# Patient Record
Sex: FEMALE | Race: WHITE | NOT HISPANIC OR LATINO | Employment: FULL TIME | URBAN - METROPOLITAN AREA
[De-identification: names, ages, dates, MRNs, and addresses within clinical notes are randomized per-mention and may not be internally consistent; named-entity substitution may affect disease eponyms.]

---

## 2018-10-11 ENCOUNTER — OFFICE VISIT (OUTPATIENT)
Dept: ENDOCRINOLOGY | Facility: CLINIC | Age: 58
End: 2018-10-11

## 2018-10-11 VITALS
OXYGEN SATURATION: 97 % | DIASTOLIC BLOOD PRESSURE: 72 MMHG | SYSTOLIC BLOOD PRESSURE: 122 MMHG | WEIGHT: 256.25 LBS | HEART RATE: 68 BPM

## 2018-10-11 DIAGNOSIS — N18.30 STAGE 3 CHRONIC KIDNEY DISEASE (HCC): ICD-10-CM

## 2018-10-11 DIAGNOSIS — E03.9 ACQUIRED HYPOTHYROIDISM: ICD-10-CM

## 2018-10-11 DIAGNOSIS — E11.42 DIABETIC PERIPHERAL NEUROPATHY ASSOCIATED WITH TYPE 2 DIABETES MELLITUS (HCC): ICD-10-CM

## 2018-10-11 DIAGNOSIS — E11.65 UNCONTROLLED TYPE 2 DIABETES MELLITUS WITH HYPERGLYCEMIA (HCC): Primary | ICD-10-CM

## 2018-10-11 PROBLEM — E11.9 TYPE 2 DIABETES MELLITUS (HCC): Status: ACTIVE | Noted: 2018-10-11

## 2018-10-11 LAB
ANION GAP SERPL CALCULATED.3IONS-SCNC: 10 MMOL/L (ref 3–11)
BUN BLD-MCNC: 27 MG/DL (ref 9–23)
BUN/CREAT SERPL: 18.5 (ref 7–25)
CALCIUM SPEC-SCNC: 9.3 MG/DL (ref 8.7–10.4)
CHLORIDE SERPL-SCNC: 101 MMOL/L (ref 99–109)
CO2 SERPL-SCNC: 26 MMOL/L (ref 20–31)
CREAT BLD-MCNC: 1.46 MG/DL (ref 0.6–1.3)
GFR SERPL CREATININE-BSD FRML MDRD: 37 ML/MIN/1.73
GLUCOSE BLD-MCNC: 344 MG/DL (ref 70–100)
GLUCOSE BLDC GLUCOMTR-MCNC: 349 MG/DL (ref 70–130)
HBA1C MFR BLD: 12 %
POTASSIUM BLD-SCNC: 4.1 MMOL/L (ref 3.5–5.5)
SODIUM BLD-SCNC: 137 MMOL/L (ref 132–146)
T4 FREE SERPL-MCNC: 1.51 NG/DL (ref 0.89–1.76)
TSH SERPL DL<=0.05 MIU/L-ACNC: 2.64 MIU/ML (ref 0.35–5.35)

## 2018-10-11 PROCEDURE — 82570 ASSAY OF URINE CREATININE: CPT | Performed by: PHYSICIAN ASSISTANT

## 2018-10-11 PROCEDURE — 99204 OFFICE O/P NEW MOD 45 MIN: CPT | Performed by: PHYSICIAN ASSISTANT

## 2018-10-11 PROCEDURE — 82947 ASSAY GLUCOSE BLOOD QUANT: CPT | Performed by: PHYSICIAN ASSISTANT

## 2018-10-11 PROCEDURE — 80048 BASIC METABOLIC PNL TOTAL CA: CPT | Performed by: PHYSICIAN ASSISTANT

## 2018-10-11 PROCEDURE — 83036 HEMOGLOBIN GLYCOSYLATED A1C: CPT | Performed by: PHYSICIAN ASSISTANT

## 2018-10-11 PROCEDURE — 84439 ASSAY OF FREE THYROXINE: CPT | Performed by: PHYSICIAN ASSISTANT

## 2018-10-11 PROCEDURE — 84443 ASSAY THYROID STIM HORMONE: CPT | Performed by: PHYSICIAN ASSISTANT

## 2018-10-11 PROCEDURE — 82043 UR ALBUMIN QUANTITATIVE: CPT | Performed by: PHYSICIAN ASSISTANT

## 2018-10-11 RX ORDER — TICAGRELOR 90 MG/1
90 TABLET ORAL 2 TIMES DAILY
COMMUNITY
Start: 2018-09-10

## 2018-10-11 RX ORDER — AMLODIPINE BESYLATE 10 MG/1
10 TABLET ORAL DAILY
COMMUNITY
Start: 2018-09-11

## 2018-10-11 RX ORDER — FUROSEMIDE 40 MG/1
40 TABLET ORAL 2 TIMES DAILY
COMMUNITY
Start: 2018-09-10

## 2018-10-11 RX ORDER — LEVOTHYROXINE SODIUM 137 UG/1
TABLET ORAL
COMMUNITY
Start: 2018-09-10 | End: 2019-06-03 | Stop reason: SDUPTHER

## 2018-10-11 RX ORDER — BLOOD-GLUCOSE METER
EACH MISCELLANEOUS
Qty: 1 KIT | Refills: 0 | Status: SHIPPED | OUTPATIENT
Start: 2018-10-11 | End: 2021-06-22 | Stop reason: ALTCHOICE

## 2018-10-11 RX ORDER — ASPIRIN 81 MG/1
81 TABLET ORAL DAILY
COMMUNITY

## 2018-10-11 RX ORDER — PIOGLITAZONEHYDROCHLORIDE 45 MG/1
TABLET ORAL
COMMUNITY
Start: 2018-09-10 | End: 2019-06-03 | Stop reason: SDUPTHER

## 2018-10-11 RX ORDER — METFORMIN HYDROCHLORIDE 500 MG/1
TABLET, EXTENDED RELEASE ORAL
COMMUNITY
Start: 2018-09-10 | End: 2019-02-27 | Stop reason: ALTCHOICE

## 2018-10-11 RX ORDER — VALSARTAN 320 MG/1
320 TABLET ORAL DAILY
COMMUNITY
Start: 2018-09-10

## 2018-10-11 RX ORDER — PANTOPRAZOLE SODIUM 40 MG/1
40 TABLET, DELAYED RELEASE ORAL DAILY
COMMUNITY
Start: 2018-09-10

## 2018-10-11 RX ORDER — GLYBURIDE 5 MG/1
10 TABLET ORAL 2 TIMES DAILY
COMMUNITY
End: 2019-06-03 | Stop reason: SDUPTHER

## 2018-10-11 RX ORDER — BISOPROLOL FUMARATE 5 MG/1
5 TABLET, FILM COATED ORAL 2 TIMES DAILY
COMMUNITY
Start: 2018-09-10

## 2018-10-11 RX ORDER — AMITRIPTYLINE HYDROCHLORIDE 10 MG/1
10 TABLET, FILM COATED ORAL NIGHTLY
COMMUNITY
Start: 2018-09-10

## 2018-10-11 RX ORDER — GLUCOSAMINE HCL/CHONDROITIN SU 500-400 MG
CAPSULE ORAL
Qty: 100 EACH | Refills: 6 | Status: SHIPPED | OUTPATIENT
Start: 2018-10-11 | End: 2021-06-22 | Stop reason: ALTCHOICE

## 2018-10-11 RX ORDER — NITROGLYCERIN 0.4 MG/1
0.4 TABLET SUBLINGUAL AS NEEDED
COMMUNITY
Start: 2018-09-10

## 2018-10-11 RX ORDER — ATORVASTATIN CALCIUM 80 MG/1
80 TABLET, FILM COATED ORAL NIGHTLY
COMMUNITY
Start: 2018-09-10

## 2018-10-11 NOTE — PROGRESS NOTES
"Chief Complaint  Establish care for Diabetes Mellitus and Hypothyroidism.    HPI   Emily Valdez is a 58 y.o. female who is here today for evaluation of Diabetes Mellitus type 2. The initial diagnosis of diabetes was made at age 34.    A1C- 12 (10/11/18), 7.3 (9/18/17)  . Ate donut and sandwich on the way to the office.  Labs reviewed:  8/22/18- TSH 4.73, FT4 1.82, FT3 2.1, GFR 44, LDL 55, , H/H wnl    Diabetic complications: cardiovascular disease s/p multiple MIs followed by Dr Gagnon. She has tingling in both feet x 20 years.   Eye exam current (within one year): utd. 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   Foot care and dental care: discussed    Current diabetic medications include:  Metofrmin XR 500mg 2 tab BID  Actos 45mg qd  Glyburide 5mg 2 tab BID    Statin: lipitor 80    Past medications: Byetta (n/v)    Diabetic Monitoring  - does not check    Nutrition:     Current diet: in general, an \"unhealthy\" diet- she admits to this. No sugared drinks.   Current exercise: none  Seen RD in past: no    Hypothyroidism- dx in her late 20s, on levothyroxine 137mcg daily. No recent dose increase.   She feels well for the most part.  Fatigued on the days she works. Job is busy, works as a  at [employer].   No new hair loss, dry skin, constipation, depression.     The following portions of the patient's history were reviewed and updated by me as appropriate: allergies, current medications, past family history, past social history, past surgical history and problem list.    No past medical history on file.    Medications    Current Outpatient Prescriptions:   •  amitriptyline (ELAVIL) 10 MG tablet, , Disp: , Rfl:   •  amLODIPine (NORVASC) 10 MG tablet, , Disp: , Rfl:   •  aspirin 81 MG EC tablet, Take 81 mg by mouth Daily., Disp: , Rfl:   •  atorvastatin (LIPITOR) 80 MG tablet, , Disp: , Rfl:   •  bisoprolol (ZEBeta) 5 MG tablet, , Disp: , Rfl:   •  BRILINTA 90 MG tablet tablet, , Disp: , Rfl:   •  " furosemide (LASIX) 40 MG tablet, , Disp: , Rfl:   •  glyBURIDE (DIAbeta) 5 MG tablet, 10 mg 2 (Two) Times a Day., Disp: , Rfl:   •  levothyroxine (SYNTHROID, LEVOTHROID) 137 MCG tablet, , Disp: , Rfl:   •  metFORMIN ER (GLUCOPHAGE-XR) 500 MG 24 hr tablet, , Disp: , Rfl:   •  pantoprazole (PROTONIX) 40 MG EC tablet, , Disp: , Rfl:   •  pioglitazone (ACTOS) 45 MG tablet, , Disp: , Rfl:   •  valsartan (DIOVAN) 320 MG tablet, , Disp: , Rfl:   •  Alcohol Swabs 70 % pads, Use qd, Disp: 100 each, Rfl: 6  •  Insulin Glargine (TOUJEO SOLOSTAR) 300 UNIT/ML solution pen-injector, Inject 20 Units under the skin into the appropriate area as directed every night at bedtime., Disp: 2 pen, Rfl: 6  •  Insulin Pen Needle 32G X 4 MM misc, Use QD, Disp: 50 each, Rfl: 6  •  nitroglycerin (NITROSTAT) 0.4 MG SL tablet, , Disp: , Rfl:     Review of Systems  Review of Systems   Constitutional: Negative for chills, fatigue, fever and unexpected weight change.   HENT: Positive for hearing loss. Negative for ear pain, nosebleeds, rhinorrhea and sore throat.    Eyes: Positive for visual disturbance. Negative for pain, discharge, redness and itching.   Respiratory: Negative for cough, shortness of breath and wheezing.    Cardiovascular: Positive for leg swelling. Negative for chest pain and palpitations.        Slow HR   Gastrointestinal: Negative for abdominal pain, blood in stool, constipation and diarrhea.   Endocrine: Negative for cold intolerance, heat intolerance and polydipsia.   Genitourinary: Negative for dysuria, frequency, hematuria, pelvic pain, vaginal bleeding and vaginal discharge.   Musculoskeletal: Negative for arthralgias, gait problem, joint swelling and myalgias.   Skin: Negative for rash.   Allergic/Immunologic: Negative.    Neurological: Negative for dizziness, syncope, weakness, numbness and headaches.   Hematological: Negative for adenopathy. Bruises/bleeds easily.   Psychiatric/Behavioral: Negative for sleep disturbance  and suicidal ideas. The patient is not nervous/anxious.         Physical Exam    /72   Pulse 68   Wt 116 kg (256 lb 4 oz)   SpO2 97% There is no height or weight on file to calculate BMI.  Physical Exam   Constitutional: She is oriented to person, place, and time. She appears well-developed. No distress.   HENT:   Head: Normocephalic.   Right Ear: External ear normal.   Left Ear: External ear normal.   Mouth/Throat: No oropharyngeal exudate.   Eyes: Conjunctivae and lids are normal. Right eye exhibits no discharge. Left eye exhibits no discharge. Right pupil is reactive. Left pupil is reactive.   Neck: No JVD present. No tracheal deviation present. No thyroid mass and no thyromegaly present.   Cardiovascular: Normal rate, regular rhythm, normal heart sounds and intact distal pulses.    No murmur heard.  Pulmonary/Chest: Effort normal and breath sounds normal. No respiratory distress. She has no wheezes.   Abdominal: Soft. Bowel sounds are normal. There is no tenderness.   Musculoskeletal: She exhibits no edema or tenderness.    Emily had a diabetic foot exam performed today.   During the foot exam she had a monofilament test performed (loss of sensation up to both heels).  Vascular Status -  Her right foot exhibits normal foot vasculature  and no edema. Her left foot exhibits normal foot vasculature  and no edema.  Skin Integrity  -  Her right foot skin is intact.  She has no right foot onychomycosis, no right foot ulcer and non-callous right foot.Her left foot skin is intact. She has no left foot onychomycosis, no left foot ulcer and non-callous left foot..  Lymphadenopathy:     She has no cervical adenopathy.   Neurological: She is alert and oriented to person, place, and time.   Skin: Skin is warm, dry and intact. No rash noted. She is not diaphoretic. No erythema.   Psychiatric: She has a normal mood and affect. Her speech is normal and behavior is normal. Thought content normal.       Labs and Imaging    Lab Results   Component Value Date    HGBA1C 12 10/11/2018     Office Visit on 10/11/2018   Component Date Value Ref Range Status   • Glucose 10/11/2018 349* 70 - 130 mg/dL Final   • Hemoglobin A1C 10/11/2018 12  % Final     No images are attached to the encounter or orders placed in the encounter.  No Images in the past 120 days found..    Assessment / Plan   Emily was seen today for establish care.    Diagnoses and all orders for this visit:    Uncontrolled type 2 diabetes mellitus with hyperglycemia (CMS/[facility])  -     POC Glucose Fingerstick  -     POC Glycosylated Hemoglobin (Hb A1C)  -     Microalbumin / Creatinine Urine Ratio - Urine, Clean Catch  -     Insulin Glargine (TOUJEO SOLOSTAR) 300 UNIT/ML solution pen-injector; Inject 20 Units under the skin into the appropriate area as directed every night at bedtime.  -     Insulin Pen Needle 32G X 4 MM misc; Use QD  -     Alcohol Swabs 70 % pads; Use qd    Acquired hypothyroidism  -     TSH  -     T4, Free    Stage 3 chronic kidney disease (CMS/[facility])  -     Basic Metabolic Panel    Diabetic peripheral neuropathy associated with type 2 diabetes mellitus (CMS/[facility])        Diabetes Mellitus 2 is under poor control.  -A1c 12, average sugar 300  -discussed diet. Literature provided  -encouraged exercise. Weekly goal discussed.  -start toujeo 10u qhs. Increase by 2u q3d up to 20u daily. Reviewed how to administer insulin. Sample and copay card provided  -cont metformin, actos, glyburide  -recheck gfr, may need to dc metformin  -rx new meter    1.  Diet: 3-4 carb servings per meal for females, 4-5 carb servings per meal for males  Spread carb intake throughout the day  Increase lean protein and vegetable intake  Avoid sugary drinks and processed carbs including crackers, cookies, cakes  2.  Exercise: Recommend at least 30 minutes of exercise daily, at least 5 days per week. Increase exercise gradually.   3.  Blood Glucose Goal: Blood glucose goal <150 fasting, <180 2 hr  postprandial  4.  Microalbumin due  5.  Education performed during this visit: long term diabetic complications discussed. , annual eye examinations at Ophthalmology discussed, dental hygiene discussed  and foot care reviewed., home glucose monitoring emphasized, all medications, side effects and compliance discussed carefully and Hypoglycemia management and prevention reviewed. Reviewed ‘ABCs’ of diabetes management (respective goals in parentheses):  A1C (<7), blood pressure (<130/80), and cholesterol (LDL <100, if CVD <70).    Hypothyroidism  -TSH slightly high 8/2018  -repeat TFTs today  -cont levothyroxine 137mcg daily.     Peripheral neuropathy  -reviewed need to wear shoes at all times and monitor feet daily    There are no Patient Instructions on file for this visit.    Follow up: Return in about 6 weeks (around 11/22/2018). She did not want to come in 4 weeks.    Discussed the nature of the disease including, risks, complications, implications, management, safe and proper use of medications. Encouraged therapeutic lifestyle changes including low calorie diet with plenty of fruits and vegetables, daily exercise, medication compliance, and keeping scheduled follow up appointments with me and any other providers. Encouraged patient to have appointment for complete physical, fasting labs, appropriate screenings, and immunizations on an annual basis.    30 min  of 45 min face-to-face visit time spent for coordination of care and counselling regarding identified problems as outlined in the objective, assessment and discussion portions of the documentation.    Awilda Conn PA-C

## 2018-10-13 LAB
CREAT 24H UR-MCNC: 60.8 MG/DL
MICROALBUMIN UR-MCNC: 15.2 UG/ML
MICROALBUMIN/CREAT UR: 25 MG/G CREAT (ref 0–30)

## 2018-10-18 ENCOUNTER — TELEPHONE (OUTPATIENT)
Dept: INTERNAL MEDICINE | Facility: CLINIC | Age: 58
End: 2018-10-18

## 2018-10-18 NOTE — TELEPHONE ENCOUNTER
LM on VM with these instructions. Advised pt to call with questions and to please let me know that she got the message.

## 2018-10-18 NOTE — TELEPHONE ENCOUNTER
PT CALLED IN TO REPORT RECENT BLOOD SUGARS     10/16/18- 258 NIGHT-231 Monday NIGHT 18 UNITS     10/17/18- 199 20 UNITS NIGHT BEFORE NIGHT 219    10/18/18- 20 UNITS NIGHT BEFORE 243  THIS MORNING 294 2 HRS AFTER LUNCH     STOPPED METFORMIN Wednesday     PT CALL BACK [phone] CAN LEAVE VM

## 2018-10-31 ENCOUNTER — TELEPHONE (OUTPATIENT)
Dept: INTERNAL MEDICINE | Facility: CLINIC | Age: 58
End: 2018-10-31

## 2018-11-30 ENCOUNTER — OFFICE VISIT (OUTPATIENT)
Dept: ENDOCRINOLOGY | Facility: CLINIC | Age: 58
End: 2018-11-30

## 2018-11-30 VITALS
WEIGHT: 250.31 LBS | HEART RATE: 76 BPM | DIASTOLIC BLOOD PRESSURE: 80 MMHG | SYSTOLIC BLOOD PRESSURE: 130 MMHG | OXYGEN SATURATION: 95 %

## 2018-11-30 DIAGNOSIS — E11.65 UNCONTROLLED TYPE 2 DIABETES MELLITUS WITH HYPERGLYCEMIA (HCC): Primary | ICD-10-CM

## 2018-11-30 DIAGNOSIS — E03.9 ACQUIRED HYPOTHYROIDISM: ICD-10-CM

## 2018-11-30 LAB — GLUCOSE BLDC GLUCOMTR-MCNC: 141 MG/DL (ref 70–130)

## 2018-11-30 PROCEDURE — 82947 ASSAY GLUCOSE BLOOD QUANT: CPT | Performed by: PHYSICIAN ASSISTANT

## 2018-11-30 PROCEDURE — 99214 OFFICE O/P EST MOD 30 MIN: CPT | Performed by: PHYSICIAN ASSISTANT

## 2018-11-30 NOTE — PROGRESS NOTES
"Chief Complaint  F/u for Diabetes Mellitus and Hypothyroidism.    HPI   Emily Valdez is a 58 y.o. female who is here today for f/u of Diabetes Mellitus type 2 and hypothyroidism. The initial diagnosis of diabetes was made at age 34.    A1C- 12 (10/11/18), 7.3 (9/18/17)   today.   Labs reviewed:  10/11/18- GFR 37, TSH 2.636, FT4 1.51  8/22/18- TSH 4.73, FT4 1.82, FT3 2.1, GFR 44, LDL 55, , H/H wnl    Diabetic complications: cardiovascular disease s/p multiple MIs followed by Dr Gagnon. She has tingling in both feet x 20 years.   Eye exam current (within one year): utd. 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   Foot care and dental care: discussed    Current diabetic medications include:  Metofrmin XR 500mg 2 tab BID - dc after last visit due to decreased renal fxn  Actos 45mg qd  Glyburide 5mg 2 tab BID  Toujeo 40U QHS  Statin: lipitor 80    Past medications: Byetta (n/v)    Diabetic Monitoring  -   Checking BID  fasting 100-130, bedtime 130-150  No hypoglycemia    Nutrition:     Current diet: in general, an \"unhealthy\" diet- has improved diet since last visit. Eat low carb.   Current exercise: none  Seen RD in past: no    Hypothyroidism- dx in her late 20s, on levothyroxine 137mcg daily. No recent dose increase.   She feels well for the most part.  Fatigued on the days she works. Job is busy, works as a  at [employer].   No new hair loss, dry skin, constipation, depression.     The following portions of the patient's history were reviewed and updated by me as appropriate: allergies, current medications, past family history, past social history, past surgical history and problem list.    History reviewed. No pertinent past medical history.    Medications    Current Outpatient Medications:   •  Alcohol Swabs 70 % pads, Use qd, Disp: 100 each, Rfl: 6  •  amitriptyline (ELAVIL) 10 MG tablet, , Disp: , Rfl:   •  amLODIPine (NORVASC) 10 MG tablet, , Disp: , Rfl:   •  aspirin 81 MG EC tablet, Take 81 mg by " mouth Daily., Disp: , Rfl:   •  atorvastatin (LIPITOR) 80 MG tablet, , Disp: , Rfl:   •  bisoprolol (ZEBeta) 5 MG tablet, , Disp: , Rfl:   •  Blood Glucose Monitoring Suppl (ACCU-CHEK JLUIS PLUS) w/Device kit, Use device to test blood sugar 3-4 times a day, Disp: 1 kit, Rfl: 0  •  BRILINTA 90 MG tablet tablet, , Disp: , Rfl:   •  furosemide (LASIX) 40 MG tablet, , Disp: , Rfl:   •  glucose blood (ACCU-CHEK JLUIS PLUS) test strip, Use as instructed to test blood sugar 3-4 times per day, Disp: 100 each, Rfl: 5  •  glyBURIDE (DIAbeta) 5 MG tablet, 10 mg 2 (Two) Times a Day., Disp: , Rfl:   •  Insulin Glargine (TOUJEO SOLOSTAR) 300 UNIT/ML solution pen-injector, Inject 40 Units under the skin into the appropriate area as directed every night at bedtime., Disp: 4 pen, Rfl: 6  •  Insulin Pen Needle 32G X 4 MM misc, Use QD, Disp: 50 each, Rfl: 6  •  Lancets 30G misc, Use as directed to test blood sugar 3-4 times a day, Disp: 100 each, Rfl: 5  •  levothyroxine (SYNTHROID, LEVOTHROID) 137 MCG tablet, , Disp: , Rfl:   •  nitroglycerin (NITROSTAT) 0.4 MG SL tablet, , Disp: , Rfl:   •  pantoprazole (PROTONIX) 40 MG EC tablet, , Disp: , Rfl:   •  pioglitazone (ACTOS) 45 MG tablet, , Disp: , Rfl:   •  valsartan (DIOVAN) 320 MG tablet, , Disp: , Rfl:   •  metFORMIN ER (GLUCOPHAGE-XR) 500 MG 24 hr tablet, , Disp: , Rfl:     Review of Systems  Review of Systems   Constitutional: Negative for chills, fatigue, fever and unexpected weight change.   HENT: Positive for hearing loss. Negative for ear pain, nosebleeds, rhinorrhea and sore throat.    Eyes: Positive for visual disturbance. Negative for pain, discharge, redness and itching.   Respiratory: Negative for cough, shortness of breath and wheezing.    Cardiovascular: Positive for leg swelling. Negative for chest pain and palpitations.        Slow HR   Gastrointestinal: Negative for abdominal pain, blood in stool, constipation and diarrhea.   Endocrine: Negative for cold intolerance,  heat intolerance and polydipsia.   Genitourinary: Negative for dysuria, frequency, hematuria, pelvic pain, vaginal bleeding and vaginal discharge.   Musculoskeletal: Negative for arthralgias, gait problem, joint swelling and myalgias.   Skin: Negative for rash.   Allergic/Immunologic: Negative.    Neurological: Negative for dizziness, syncope, weakness, numbness and headaches.   Hematological: Negative for adenopathy. Bruises/bleeds easily.   Psychiatric/Behavioral: Negative for sleep disturbance and suicidal ideas. The patient is not nervous/anxious.         Physical Exam    /80   Pulse 76   Wt 114 kg (250 lb 5 oz)   SpO2 95% There is no height or weight on file to calculate BMI.  Physical Exam   Constitutional: She is oriented to person, place, and time. She appears well-developed. No distress.   HENT:   Head: Normocephalic.   Right Ear: External ear normal.   Left Ear: External ear normal.   Mouth/Throat: No oropharyngeal exudate.   Eyes: Conjunctivae and lids are normal. Right eye exhibits no discharge. Left eye exhibits no discharge. Right pupil is reactive. Left pupil is reactive.   Neck: No JVD present. No tracheal deviation present. No thyroid mass and no thyromegaly present.   Cardiovascular: Normal rate, regular rhythm, normal heart sounds and intact distal pulses.   No murmur heard.  Pulmonary/Chest: Effort normal and breath sounds normal. No respiratory distress. She has no wheezes.   Abdominal: Soft. Bowel sounds are normal. There is no tenderness.   Musculoskeletal: She exhibits no edema or tenderness.   Lymphadenopathy:     She has no cervical adenopathy.   Neurological: She is alert and oriented to person, place, and time.   Skin: Skin is warm, dry and intact. No rash noted. She is not diaphoretic. No erythema.   Psychiatric: She has a normal mood and affect. Her speech is normal and behavior is normal. Thought content normal.       Labs and Imaging   Lab Results   Component Value Date     HGBA1C 12 10/11/2018     Office Visit on 11/30/2018   Component Date Value Ref Range Status   • Glucose 11/30/2018 141* 70 - 130 mg/dL Final     No images are attached to the encounter or orders placed in the encounter.  No Images in the past 120 days found..    Assessment / Plan   Emily was seen today for follow-up.    Diagnoses and all orders for this visit:    Uncontrolled type 2 diabetes mellitus with hyperglycemia (CMS/[facility])  -     POC Glucose Fingerstick  -     Insulin Glargine (TOUJEO SOLOSTAR) 300 UNIT/ML solution pen-injector; Inject 40 Units under the skin into the appropriate area as directed every night at bedtime.    Acquired hypothyroidism        Diabetes Mellitus 2 is under poor control.  -A1c 12 10/2018  -BS with significant improvement since last visit per BS log- fasting 100-130, bedtime 130-150  -off metformin since last visit due to decreased renal fxn  -start toujeo 10u qhs. Increase by 2u q3d up to 20u daily. Reviewed how to administer insulin. Sample and copay card provided  -cont actos, glyburide      1.  Diet: 3-4 carb servings per meal for females, 4-5 carb servings per meal for males  Spread carb intake throughout the day  Increase lean protein and vegetable intake  Avoid sugary drinks and processed carbs including crackers, cookies, cakes  2.  Exercise: Recommend at least 30 minutes of exercise daily, at least 5 days per week. Increase exercise gradually.   3.  Blood Glucose Goal: Blood glucose goal <150 fasting, <180 2 hr postprandial  4.  Microalbumin due 10/2018  5.  Education performed during this visit: long term diabetic complications discussed. , annual eye examinations at Ophthalmology discussed, dental hygiene discussed  and foot care reviewed., home glucose monitoring emphasized, all medications, side effects and compliance discussed carefully and Hypoglycemia management and prevention reviewed. Reviewed ‘ABCs’ of diabetes management (respective goals in parentheses):  A1C (<7),  blood pressure (<130/80), and cholesterol (LDL <100, if CVD <70).    Hypothyroidism  -TSH wnl 10/2018  -cont levothyroxine 137mcg daily.       There are no Patient Instructions on file for this visit.    Follow up: Return in about 3 months (around 2/28/2019).     Discussed the nature of the disease including, risks, complications, implications, management, safe and proper use of medications. Encouraged therapeutic lifestyle changes including low calorie diet with plenty of fruits and vegetables, daily exercise, medication compliance, and keeping scheduled follow up appointments with me and any other providers. Encouraged patient to have appointment for complete physical, fasting labs, appropriate screenings, and immunizations on an annual basis.    20 min  of 30 min face-to-face visit time spent for coordination of care and counselling regarding identified problems as outlined in the objective, assessment and discussion portions of the documentation.    Awilda Conn PA-C

## 2019-02-27 ENCOUNTER — OFFICE VISIT (OUTPATIENT)
Dept: ENDOCRINOLOGY | Facility: CLINIC | Age: 59
End: 2019-02-27

## 2019-02-27 VITALS
SYSTOLIC BLOOD PRESSURE: 140 MMHG | DIASTOLIC BLOOD PRESSURE: 82 MMHG | HEART RATE: 66 BPM | OXYGEN SATURATION: 96 % | WEIGHT: 257 LBS

## 2019-02-27 DIAGNOSIS — E11.65 UNCONTROLLED TYPE 2 DIABETES MELLITUS WITH HYPERGLYCEMIA (HCC): Primary | ICD-10-CM

## 2019-02-27 DIAGNOSIS — E03.9 ACQUIRED HYPOTHYROIDISM: ICD-10-CM

## 2019-02-27 LAB
ALBUMIN SERPL-MCNC: 4.29 G/DL (ref 3.2–4.8)
ALBUMIN/GLOB SERPL: 1.9 G/DL (ref 1.5–2.5)
ALP SERPL-CCNC: 94 U/L (ref 25–100)
ALT SERPL W P-5'-P-CCNC: 17 U/L (ref 7–40)
ANION GAP SERPL CALCULATED.3IONS-SCNC: 11 MMOL/L (ref 3–11)
ARTICHOKE IGE QN: 78 MG/DL (ref 0–130)
AST SERPL-CCNC: 17 U/L (ref 0–33)
BILIRUB SERPL-MCNC: 0.3 MG/DL (ref 0.3–1.2)
BUN BLD-MCNC: 43 MG/DL (ref 9–23)
BUN/CREAT SERPL: 27.7 (ref 7–25)
CALCIUM SPEC-SCNC: 9.6 MG/DL (ref 8.7–10.4)
CHLORIDE SERPL-SCNC: 109 MMOL/L (ref 99–109)
CHOLEST SERPL-MCNC: 142 MG/DL (ref 0–200)
CO2 SERPL-SCNC: 23 MMOL/L (ref 20–31)
CREAT BLD-MCNC: 1.55 MG/DL (ref 0.6–1.3)
GFR SERPL CREATININE-BSD FRML MDRD: 35 ML/MIN/1.73
GLOBULIN UR ELPH-MCNC: 2.3 GM/DL
GLUCOSE BLD-MCNC: 85 MG/DL (ref 70–100)
GLUCOSE BLDC GLUCOMTR-MCNC: 88 MG/DL (ref 70–130)
HBA1C MFR BLD: 7.2 %
HDLC SERPL-MCNC: 40 MG/DL (ref 40–60)
POTASSIUM BLD-SCNC: 4.1 MMOL/L (ref 3.5–5.5)
PROT SERPL-MCNC: 6.6 G/DL (ref 5.7–8.2)
SODIUM BLD-SCNC: 143 MMOL/L (ref 132–146)
T4 FREE SERPL-MCNC: 1.55 NG/DL (ref 0.89–1.76)
TRIGL SERPL-MCNC: 117 MG/DL (ref 0–150)
TSH SERPL DL<=0.05 MIU/L-ACNC: 0.66 MIU/ML (ref 0.35–5.35)

## 2019-02-27 PROCEDURE — 82947 ASSAY GLUCOSE BLOOD QUANT: CPT | Performed by: PHYSICIAN ASSISTANT

## 2019-02-27 PROCEDURE — 83036 HEMOGLOBIN GLYCOSYLATED A1C: CPT | Performed by: PHYSICIAN ASSISTANT

## 2019-02-27 PROCEDURE — 84443 ASSAY THYROID STIM HORMONE: CPT | Performed by: PHYSICIAN ASSISTANT

## 2019-02-27 PROCEDURE — 99214 OFFICE O/P EST MOD 30 MIN: CPT | Performed by: PHYSICIAN ASSISTANT

## 2019-02-27 PROCEDURE — 80061 LIPID PANEL: CPT | Performed by: PHYSICIAN ASSISTANT

## 2019-02-27 PROCEDURE — 80053 COMPREHEN METABOLIC PANEL: CPT | Performed by: PHYSICIAN ASSISTANT

## 2019-02-27 PROCEDURE — 84439 ASSAY OF FREE THYROXINE: CPT | Performed by: PHYSICIAN ASSISTANT

## 2019-02-27 NOTE — PROGRESS NOTES
"Chief Complaint  F/u for Diabetes Mellitus and Hypothyroidism.    HPI   Emily Valdez is a 58 y.o. female who is here today for f/u of Diabetes Mellitus type 2 and hypothyroidism. The initial diagnosis of diabetes was made at age 34.    A1C- 7.2 (2/27/19), 12 (10/11/18), 7.3 (9/18/17)    Labs reviewed:  10/11/18- GFR 37, TSH 2.636, FT4 1.51  8/22/18- TSH 4.73, FT4 1.82, FT3 2.1, GFR 44, LDL 55, , H/H wnl    Diabetic complications: cardiovascular disease s/p multiple MIs followed by Dr Gagnon. She has tingling in both feet x 20 years.   Eye exam current (within one year): utd. 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   Foot care and dental care: discussed    Current diabetic medications include:  Actos 45mg qd  Glyburide 5mg 2 tab BID. Does not take AC  Toujeo 50U QHS- increased from 40u on her own  Statin: lipitor 80    Past medications: Byetta (n/v), metformin (dc d/t decreased renal fxn)    Diabetic Monitoring  -   Checking 2x/day  fasting mostly 115-135, during the day mostly <180  Some hypoglycemia during the day    Nutrition:     Current diet: in general, an \"unhealthy\" diet- has improved diet since last visit. Eat low carb.   Current exercise: none  Seen RD in past: no    Hypothyroidism- dx in her late 20s, on levothyroxine 137mcg daily. No recent dose increase.   She feels well for the most part.  Fatigued on the days she works. Job is busy, works as a  at [employer].   No new hair loss, dry skin, constipation, depression.     The following portions of the patient's history were reviewed and updated by me as appropriate: allergies, current medications, past family history, past social history, past surgical history and problem list.    History reviewed. No pertinent past medical history.    Medications    Current Outpatient Medications:   •  Alcohol Swabs 70 % pads, Use qd, Disp: 100 each, Rfl: 6  •  amitriptyline (ELAVIL) 10 MG tablet, , Disp: , Rfl:   •  amLODIPine (NORVASC) 10 MG tablet, , Disp: , " Rfl:   •  aspirin 81 MG EC tablet, Take 81 mg by mouth Daily., Disp: , Rfl:   •  atorvastatin (LIPITOR) 80 MG tablet, , Disp: , Rfl:   •  bisoprolol (ZEBeta) 5 MG tablet, , Disp: , Rfl:   •  Blood Glucose Monitoring Suppl (ACCU-CHEK JLUIS PLUS) w/Device kit, Use device to test blood sugar 3-4 times a day, Disp: 1 kit, Rfl: 0  •  BRILINTA 90 MG tablet tablet, , Disp: , Rfl:   •  furosemide (LASIX) 40 MG tablet, , Disp: , Rfl:   •  glucose blood (ACCU-CHEK JLUIS PLUS) test strip, Use as instructed to test blood sugar 3-4 times per day, Disp: 100 each, Rfl: 5  •  glyBURIDE (DIAbeta) 5 MG tablet, 10 mg 2 (Two) Times a Day., Disp: , Rfl:   •  Insulin Glargine (TOUJEO SOLOSTAR) 300 UNIT/ML solution pen-injector, Inject 48 Units under the skin into the appropriate area as directed every night at bedtime., Disp: 12 pen, Rfl: 1  •  Insulin Pen Needle 32G X 4 MM misc, Use QD, Disp: 50 each, Rfl: 6  •  Lancets 30G misc, Use as directed to test blood sugar 3-4 times a day, Disp: 100 each, Rfl: 5  •  levothyroxine (SYNTHROID, LEVOTHROID) 137 MCG tablet, , Disp: , Rfl:   •  nitroglycerin (NITROSTAT) 0.4 MG SL tablet, , Disp: , Rfl:   •  pantoprazole (PROTONIX) 40 MG EC tablet, , Disp: , Rfl:   •  pioglitazone (ACTOS) 45 MG tablet, , Disp: , Rfl:   •  valsartan (DIOVAN) 320 MG tablet, , Disp: , Rfl:     Review of Systems  Review of Systems   Constitutional: Negative for chills, fatigue, fever and unexpected weight change.   HENT: Positive for hearing loss. Negative for ear pain, nosebleeds, rhinorrhea and sore throat.    Eyes: Positive for visual disturbance. Negative for pain, discharge, redness and itching.   Respiratory: Negative for cough, shortness of breath and wheezing.    Cardiovascular: Positive for leg swelling. Negative for chest pain and palpitations.        Slow HR   Gastrointestinal: Negative for abdominal pain, blood in stool, constipation and diarrhea.   Endocrine: Negative for cold intolerance, heat intolerance  and polydipsia.   Genitourinary: Negative for dysuria, frequency, hematuria, pelvic pain, vaginal bleeding and vaginal discharge.   Musculoskeletal: Negative for arthralgias, gait problem, joint swelling and myalgias.   Skin: Negative for rash.   Allergic/Immunologic: Negative.    Neurological: Negative for dizziness, syncope, weakness, numbness and headaches.   Hematological: Negative for adenopathy. Bruises/bleeds easily.   Psychiatric/Behavioral: Negative for sleep disturbance and suicidal ideas. The patient is not nervous/anxious.         Physical Exam    /82   Pulse 66   Wt 117 kg (257 lb)   SpO2 96% There is no height or weight on file to calculate BMI.  Physical Exam   Constitutional: She is oriented to person, place, and time. She appears well-developed. No distress.   HENT:   Head: Normocephalic.   Right Ear: External ear normal.   Left Ear: External ear normal.   Mouth/Throat: No oropharyngeal exudate.   Eyes: Conjunctivae and lids are normal. Right eye exhibits no discharge. Left eye exhibits no discharge. Right pupil is reactive. Left pupil is reactive.   Neck: No JVD present. No tracheal deviation present. No thyroid mass and no thyromegaly present.   Cardiovascular: Normal rate, regular rhythm, normal heart sounds and intact distal pulses.   No murmur heard.  Pulmonary/Chest: Effort normal and breath sounds normal. No respiratory distress. She has no wheezes.   Abdominal: Soft. Bowel sounds are normal. There is no tenderness.   Musculoskeletal: She exhibits no edema or tenderness.   Lymphadenopathy:     She has no cervical adenopathy.   Neurological: She is alert and oriented to person, place, and time.   Skin: Skin is warm, dry and intact. No rash noted. She is not diaphoretic. No erythema.   Psychiatric: She has a normal mood and affect. Her speech is normal and behavior is normal. Thought content normal.       Labs and Imaging   Lab Results   Component Value Date    HGBA1C 12 10/11/2018      No visits with results within 1 Month(s) from this visit.   Latest known visit with results is:   Office Visit on 11/30/2018   Component Date Value Ref Range Status   • Glucose 11/30/2018 141* 70 - 130 mg/dL Final     No images are attached to the encounter or orders placed in the encounter.  No Images in the past 120 days found..    Assessment / Plan   Emily was seen today for follow-up.    Diagnoses and all orders for this visit:    Uncontrolled type 2 diabetes mellitus with hyperglycemia (CMS/[facility])  -     Lipid Panel  -     Comprehensive Metabolic Panel  -     POC Glucose Fingerstick  -     POC Glycosylated Hemoglobin (Hb A1C)  -     Insulin Glargine (TOUJEO SOLOSTAR) 300 UNIT/ML solution pen-injector; Inject 48 Units under the skin into the appropriate area as directed every night at bedtime.    Acquired hypothyroidism  -     TSH  -     T4, Free        Diabetes Mellitus 2 is now under good control, with overall great improvement.  -A1c 7.2, down from 12 10/2018  -decrease toujeo to 48U QHS  -cont actos, glyburide  -emphasis on taking glyburide AC to avoid hypoglycemia      1.  Diet: 3-4 carb servings per meal for females, 4-5 carb servings per meal for males  Spread carb intake throughout the day  Increase lean protein and vegetable intake  Avoid sugary drinks and processed carbs including crackers, cookies, cakes  2.  Exercise: Recommend at least 30 minutes of exercise daily, at least 5 days per week. Increase exercise gradually.   3.  Blood Glucose Goal: Blood glucose goal <150 fasting, <180 2 hr postprandial  4.  Microalbumin due 10/2018  5.  Education performed during this visit: long term diabetic complications discussed. , annual eye examinations at Ophthalmology discussed, dental hygiene discussed  and foot care reviewed., home glucose monitoring emphasized, all medications, side effects and compliance discussed carefully and Hypoglycemia management and prevention reviewed. Reviewed ‘ABCs’ of diabetes  management (respective goals in parentheses):  A1C (<7), blood pressure (<130/80), and cholesterol (LDL <100, if CVD <70).    Hypothyroidism  -TSH wnl 10/2018  -cont levothyroxine 137mcg daily.  -recheck labs today       There are no Patient Instructions on file for this visit.    Follow up: Return in about 3 months (around 5/27/2019).     Discussed the nature of the disease including, risks, complications, implications, management, safe and proper use of medications. Encouraged therapeutic lifestyle changes including low calorie diet with plenty of fruits and vegetables, daily exercise, medication compliance, and keeping scheduled follow up appointments with me and any other providers. Encouraged patient to have appointment for complete physical, fasting labs, appropriate screenings, and immunizations on an annual basis.    20 min  of 30 min face-to-face visit time spent for coordination of care and counselling regarding identified problems as outlined in the objective, assessment and discussion portions of the documentation.    Awilda Conn PA-C

## 2019-06-03 ENCOUNTER — OFFICE VISIT (OUTPATIENT)
Dept: ENDOCRINOLOGY | Facility: CLINIC | Age: 59
End: 2019-06-03

## 2019-06-03 VITALS
DIASTOLIC BLOOD PRESSURE: 80 MMHG | OXYGEN SATURATION: 96 % | SYSTOLIC BLOOD PRESSURE: 128 MMHG | WEIGHT: 265 LBS | HEART RATE: 69 BPM

## 2019-06-03 DIAGNOSIS — E11.649 DIABETIC HYPOGLYCEMIA (HCC): ICD-10-CM

## 2019-06-03 DIAGNOSIS — E03.9 ACQUIRED HYPOTHYROIDISM: ICD-10-CM

## 2019-06-03 DIAGNOSIS — E11.65 UNCONTROLLED TYPE 2 DIABETES MELLITUS WITH HYPERGLYCEMIA (HCC): Primary | ICD-10-CM

## 2019-06-03 LAB
GLUCOSE BLDC GLUCOMTR-MCNC: 152 MG/DL (ref 70–130)
HBA1C MFR BLD: 7.5 %

## 2019-06-03 PROCEDURE — 82947 ASSAY GLUCOSE BLOOD QUANT: CPT | Performed by: PHYSICIAN ASSISTANT

## 2019-06-03 PROCEDURE — 83036 HEMOGLOBIN GLYCOSYLATED A1C: CPT | Performed by: PHYSICIAN ASSISTANT

## 2019-06-03 PROCEDURE — 99214 OFFICE O/P EST MOD 30 MIN: CPT | Performed by: PHYSICIAN ASSISTANT

## 2019-06-03 RX ORDER — PIOGLITAZONEHYDROCHLORIDE 45 MG/1
45 TABLET ORAL DAILY
Qty: 90 TABLET | Refills: 1 | Status: SHIPPED | OUTPATIENT
Start: 2019-06-03 | End: 2019-09-04 | Stop reason: SDUPTHER

## 2019-06-03 RX ORDER — LEVOTHYROXINE SODIUM 137 UG/1
137 TABLET ORAL DAILY
Qty: 90 TABLET | Refills: 1 | Status: SHIPPED | OUTPATIENT
Start: 2019-06-03 | End: 2019-09-04 | Stop reason: SDUPTHER

## 2019-06-03 RX ORDER — GLYBURIDE 5 MG/1
10 TABLET ORAL 2 TIMES DAILY WITH MEALS
Qty: 360 TABLET | Refills: 1 | Status: SHIPPED | OUTPATIENT
Start: 2019-06-03 | End: 2019-09-04 | Stop reason: SDUPTHER

## 2019-06-03 NOTE — PROGRESS NOTES
"Chief Complaint  F/u for Diabetes Mellitus and Hypothyroidism.    HPI   Emily Valdez is a 59 y.o. female who is here today for f/u of Diabetes Mellitus type 2 and hypothyroidism. The initial diagnosis of diabetes was made at age 34.    A1C- 7.5 (6/3/19), 7.2 (2/27/19), 12 (10/11/18), 7.3 (9/18/17)    Diabetic complications: cardiovascular disease s/p multiple MIs followed by Dr Gagnon. She has tingling in both feet x 20 years.   Eye exam current (within one year): utd. 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   Foot care and dental care: discussed    Current diabetic medications include:  Actos 45mg qd  Glyburide 5mg 2 tab BID.  Toujeo 50U QHS - did not decrease to 45u as discussed last visit  Statin: lipitor 80    Past medications: Byetta (n/v), metformin (dc d/t decreased renal fxn)    Diabetic Monitoring  -   Checking 2x/day  fasting mostly <130, during the day mostly <180  Some hypoglycemia during the night/morning    Nutrition:     Current diet: in general, an \"unhealthy\" diet- has improved diet since last visit. Eat low carb.   Current exercise: none  Seen RD in past: no    Hypothyroidism- dx in her late 20s, on levothyroxine 137mcg daily. No recent dose increase.   She feels well for the most part.  Fatigued on the days she works. Job is busy, works as a  at [employer].   No new hair loss, dry skin, constipation, depression.     The following portions of the patient's history were reviewed and updated by me as appropriate: allergies, current medications, past family history, past social history, past surgical history and problem list.    History reviewed. No pertinent past medical history.    Medications    Current Outpatient Medications:   •  Alcohol Swabs 70 % pads, Use qd, Disp: 100 each, Rfl: 6  •  amitriptyline (ELAVIL) 10 MG tablet, , Disp: , Rfl:   •  amLODIPine (NORVASC) 10 MG tablet, , Disp: , Rfl:   •  aspirin 81 MG EC tablet, Take 81 mg by mouth Daily., Disp: , Rfl:   •  atorvastatin (LIPITOR) " 80 MG tablet, , Disp: , Rfl:   •  bisoprolol (ZEBeta) 5 MG tablet, , Disp: , Rfl:   •  Blood Glucose Monitoring Suppl (ACCU-CHEK JLUIS PLUS) w/Device kit, Use device to test blood sugar 3-4 times a day, Disp: 1 kit, Rfl: 0  •  BRILINTA 90 MG tablet tablet, , Disp: , Rfl:   •  furosemide (LASIX) 40 MG tablet, , Disp: , Rfl:   •  glucose blood (ACCU-CHEK JLUIS PLUS) test strip, Use as instructed to test blood sugar 3-4 times per day, Disp: 100 each, Rfl: 5  •  glyBURIDE (DIAbeta) 5 MG tablet, Take 2 tablets by mouth 2 (Two) Times a Day With Meals., Disp: 360 tablet, Rfl: 1  •  Insulin Glargine (TOUJEO SOLOSTAR) 300 UNIT/ML solution pen-injector, Inject 45 Units under the skin into the appropriate area as directed every night at bedtime., Disp: 12 pen, Rfl: 1  •  Insulin Pen Needle 32G X 4 MM misc, Use QD, Disp: 50 each, Rfl: 6  •  Lancets 30G misc, Use as directed to test blood sugar 3-4 times a day, Disp: 100 each, Rfl: 5  •  levothyroxine (SYNTHROID, LEVOTHROID) 137 MCG tablet, Take 1 tablet by mouth Daily., Disp: 90 tablet, Rfl: 1  •  nitroglycerin (NITROSTAT) 0.4 MG SL tablet, , Disp: , Rfl:   •  pantoprazole (PROTONIX) 40 MG EC tablet, , Disp: , Rfl:   •  pioglitazone (ACTOS) 45 MG tablet, Take 1 tablet by mouth Daily., Disp: 90 tablet, Rfl: 1  •  valsartan (DIOVAN) 320 MG tablet, , Disp: , Rfl:     Review of Systems  Review of Systems   Constitutional: Negative for chills, fatigue, fever and unexpected weight change.   HENT: Positive for hearing loss. Negative for ear pain, nosebleeds, rhinorrhea and sore throat.    Eyes: Positive for visual disturbance. Negative for pain, discharge, redness and itching.   Respiratory: Negative for cough, shortness of breath and wheezing.    Cardiovascular: Positive for leg swelling. Negative for chest pain and palpitations.        Slow HR   Gastrointestinal: Negative for abdominal pain, blood in stool, constipation and diarrhea.   Endocrine: Negative for cold intolerance, heat  intolerance and polydipsia.   Genitourinary: Negative for dysuria, frequency, hematuria, pelvic pain, vaginal bleeding and vaginal discharge.   Musculoskeletal: Negative for arthralgias, gait problem, joint swelling and myalgias.   Skin: Negative for rash.   Allergic/Immunologic: Negative.    Neurological: Negative for dizziness, syncope, weakness, numbness and headaches.   Hematological: Negative for adenopathy. Bruises/bleeds easily.   Psychiatric/Behavioral: Negative for sleep disturbance and suicidal ideas. The patient is not nervous/anxious.         Physical Exam    /80   Pulse 69   Wt 120 kg (265 lb)   SpO2 96% There is no height or weight on file to calculate BMI.  Physical Exam   Constitutional: She is oriented to person, place, and time. She appears well-developed. No distress.   HENT:   Head: Normocephalic.   Right Ear: External ear normal.   Left Ear: External ear normal.   Mouth/Throat: No oropharyngeal exudate.   Eyes: Conjunctivae and lids are normal. Right eye exhibits no discharge. Left eye exhibits no discharge. Right pupil is reactive. Left pupil is reactive.   Neck: No JVD present. No tracheal deviation present. No thyroid mass and no thyromegaly present.   Cardiovascular: Normal rate, regular rhythm, normal heart sounds and intact distal pulses.   No murmur heard.  Pulmonary/Chest: Effort normal and breath sounds normal. No respiratory distress. She has no wheezes.   Abdominal: Soft. Bowel sounds are normal. There is no tenderness.   Musculoskeletal: She exhibits no edema or tenderness.   Lymphadenopathy:     She has no cervical adenopathy.   Neurological: She is alert and oriented to person, place, and time.   Skin: Skin is warm, dry and intact. No rash noted. She is not diaphoretic. No erythema.   Psychiatric: She has a normal mood and affect. Her speech is normal and behavior is normal. Thought content normal.       Labs and Imaging   Lab Results   Component Value Date    HGBA1C 7.5  06/03/2019    HGBA1C 7.2 02/27/2019    HGBA1C 12 10/11/2018     Office Visit on 06/03/2019   Component Date Value Ref Range Status   • Glucose 06/03/2019 152* 70 - 130 mg/dL Final   • Hemoglobin A1C 06/03/2019 7.5  % Final     No images are attached to the encounter or orders placed in the encounter.  No Images in the past 120 days found..    Assessment / Plan   Emily was seen today for follow-up.    Diagnoses and all orders for this visit:    Uncontrolled type 2 diabetes mellitus with hyperglycemia (CMS/HCC)  -     POC Glucose Fingerstick  -     POC Glycosylated Hemoglobin (Hb A1C)  -     pioglitazone (ACTOS) 45 MG tablet; Take 1 tablet by mouth Daily.  -     glyBURIDE (DIAbeta) 5 MG tablet; Take 2 tablets by mouth 2 (Two) Times a Day With Meals.  -     Insulin Glargine (TOUJEO SOLOSTAR) 300 UNIT/ML solution pen-injector; Inject 45 Units under the skin into the appropriate area as directed every night at bedtime.    Acquired hypothyroidism  -     levothyroxine (SYNTHROID, LEVOTHROID) 137 MCG tablet; Take 1 tablet by mouth Daily.    Diabetic hypoglycemia (CMS/HCC)        Diabetes Mellitus 2 is now under fair control  -A1c 7.5 up from 7.2 2/2019, but down from 12 10/2018  -some night time/morning hypglycemia  -decrease toujeo to 45U qhs. Did not decrease last visit.  -cont actos, glyburide  -emphasis on taking glyburide AC to avoid hypoglycemia  -call if having issues      1.  Diet: 3-4 carb servings per meal for females, 4-5 carb servings per meal for males  Spread carb intake throughout the day  Increase lean protein and vegetable intake  Avoid sugary drinks and processed carbs including crackers, cookies, cakes  2.  Exercise: Recommend at least 30 minutes of exercise daily, at least 5 days per week. Increase exercise gradually.   3.  Blood Glucose Goal: Blood glucose goal <150 fasting, <180 2 hr postprandial  4.  Microalbumin due 10/2018  5.  Education performed during this visit: long term diabetic complications  discussed. , annual eye examinations at Ophthalmology discussed, dental hygiene discussed  and foot care reviewed., home glucose monitoring emphasized, all medications, side effects and compliance discussed carefully and Hypoglycemia management and prevention reviewed. Reviewed ‘ABCs’ of diabetes management (respective goals in parentheses):  A1C (<7), blood pressure (<130/80), and cholesterol (LDL <100, if CVD <70).    Hypothyroidism  -TSH wnl 10/2018  -cont levothyroxine 137mcg daily.  -normal TFTs 2/2019      Patient Instructions   Decrease Toujeo to 45 units at bedtime.  Continue actos 45mg daily and glyburide 5mg 2 pills 2x/day before meals.       Follow up: Return in about 3 months (around 9/3/2019).     Discussed the nature of the disease including, risks, complications, implications, management, safe and proper use of medications. Encouraged therapeutic lifestyle changes including low calorie diet with plenty of fruits and vegetables, daily exercise, medication compliance, and keeping scheduled follow up appointments with me and any other providers. Encouraged patient to have appointment for complete physical, fasting labs, appropriate screenings, and immunizations on an annual basis.    Awilda Conn PA-C

## 2019-06-03 NOTE — PATIENT INSTRUCTIONS
Decrease Toujeo to 45 units at bedtime.  Continue actos 45mg daily and glyburide 5mg 2 pills 2x/day before meals.

## 2019-09-04 ENCOUNTER — OFFICE VISIT (OUTPATIENT)
Dept: ENDOCRINOLOGY | Facility: CLINIC | Age: 59
End: 2019-09-04

## 2019-09-04 VITALS
SYSTOLIC BLOOD PRESSURE: 120 MMHG | HEART RATE: 61 BPM | OXYGEN SATURATION: 97 % | WEIGHT: 265 LBS | DIASTOLIC BLOOD PRESSURE: 78 MMHG

## 2019-09-04 DIAGNOSIS — E03.9 ACQUIRED HYPOTHYROIDISM: ICD-10-CM

## 2019-09-04 DIAGNOSIS — E11.65 UNCONTROLLED TYPE 2 DIABETES MELLITUS WITH HYPERGLYCEMIA (HCC): Primary | ICD-10-CM

## 2019-09-04 LAB
GLUCOSE BLDC GLUCOMTR-MCNC: 147 MG/DL (ref 70–130)
HBA1C MFR BLD: 7.6 %

## 2019-09-04 PROCEDURE — 99213 OFFICE O/P EST LOW 20 MIN: CPT | Performed by: PHYSICIAN ASSISTANT

## 2019-09-04 PROCEDURE — 83036 HEMOGLOBIN GLYCOSYLATED A1C: CPT | Performed by: PHYSICIAN ASSISTANT

## 2019-09-04 PROCEDURE — 82947 ASSAY GLUCOSE BLOOD QUANT: CPT | Performed by: PHYSICIAN ASSISTANT

## 2019-09-04 RX ORDER — PIOGLITAZONEHYDROCHLORIDE 45 MG/1
45 TABLET ORAL DAILY
Qty: 90 TABLET | Refills: 1 | Status: SHIPPED | OUTPATIENT
Start: 2019-09-04 | End: 2020-03-16 | Stop reason: SDUPTHER

## 2019-09-04 RX ORDER — POTASSIUM CHLORIDE 750 MG/1
10 CAPSULE, EXTENDED RELEASE ORAL DAILY
COMMUNITY
Start: 2019-08-27

## 2019-09-04 RX ORDER — LEVOTHYROXINE SODIUM 137 UG/1
137 TABLET ORAL DAILY
Qty: 90 TABLET | Refills: 1 | Status: SHIPPED | OUTPATIENT
Start: 2019-09-04 | End: 2020-08-19 | Stop reason: SDUPTHER

## 2019-09-04 RX ORDER — GLYBURIDE 5 MG/1
10 TABLET ORAL 2 TIMES DAILY WITH MEALS
Qty: 360 TABLET | Refills: 1 | Status: SHIPPED | OUTPATIENT
Start: 2019-09-04 | End: 2020-03-16 | Stop reason: SDUPTHER

## 2019-09-04 NOTE — PROGRESS NOTES
"Chief Complaint  F/u for Diabetes Mellitus and Hypothyroidism.    HPI   Emily Valdez is a 59 y.o. female who is here today for f/u of Diabetes Mellitus type 2 and hypothyroidism. The initial diagnosis of diabetes was made at age 34.    Changed from 8h to 12h shifts at work at the end of July, which caused a change in eating habits. It took her several week to get back on track. Doing much better now.     A1C- 7.6 (9/4/19), 7.5 (6/3/19), 7.2 (2/27/19), 12 (10/11/18), 7.3 (9/18/17)    Diabetic complications: cardiovascular disease s/p multiple MIs followed by Dr Gagnon. She has tingling in both feet x 20 years.   Eye exam current (within one year): utd. 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   Foot care and dental care: discussed    Current diabetic medications include:  Actos 45mg qd  Glyburide 5mg 2 tab BID.  Toujeo 50U QHS - did not decrease to 45u   Statin: lipitor 80    Past medications: Byetta (n/v), metformin (dc d/t decreased renal fxn)    Diabetic Monitoring  -   Checking 2x/day  fasting mostly <130, during the day mostly <180  Rare hypoglycemia    Nutrition:     Current diet: in general, an \"unhealthy\" diet- has improved diet since last visit. Eat low carb.   Current exercise: none  Seen RD in past: no    Hypothyroidism- dx in her late 20s, on levothyroxine 137mcg daily. No recent dose increase.   She feels well for the most part.  Fatigued on the days she works. Job is busy, works as a  at [employer].   No new hair loss, dry skin, constipation, depression.     The following portions of the patient's history were reviewed and updated by me as appropriate: allergies, current medications, past family history, past social history, past surgical history and problem list.    History reviewed. No pertinent past medical history.    Medications    Current Outpatient Medications:   •  Alcohol Swabs 70 % pads, Use qd, Disp: 100 each, Rfl: 6  •  amitriptyline (ELAVIL) 10 MG tablet, , Disp: , Rfl:   •  amLODIPine " (NORVASC) 10 MG tablet, , Disp: , Rfl:   •  aspirin 81 MG EC tablet, Take 81 mg by mouth Daily., Disp: , Rfl:   •  atorvastatin (LIPITOR) 80 MG tablet, , Disp: , Rfl:   •  bisoprolol (ZEBeta) 5 MG tablet, , Disp: , Rfl:   •  Blood Glucose Monitoring Suppl (ACCU-CHEK JLUIS PLUS) w/Device kit, Use device to test blood sugar 3-4 times a day, Disp: 1 kit, Rfl: 0  •  BRILINTA 90 MG tablet tablet, , Disp: , Rfl:   •  furosemide (LASIX) 40 MG tablet, , Disp: , Rfl:   •  glucose blood (ACCU-CHEK JLUIS PLUS) test strip, Use as instructed to test blood sugar 3-4 times per day, Disp: 300 each, Rfl: 3  •  glyBURIDE (DIAbeta) 5 MG tablet, Take 2 tablets by mouth 2 (Two) Times a Day With Meals., Disp: 360 tablet, Rfl: 1  •  Insulin Glargine (TOUJEO SOLOSTAR) 300 UNIT/ML solution pen-injector injection, Inject 45-50 Units under the skin into the appropriate area as directed every night at bedtime. As directed, Disp: 12 pen, Rfl: 1  •  Insulin Pen Needle 32G X 4 MM misc, Use QD, Disp: 150 each, Rfl: 3  •  Lancets 30G misc, Use as directed to test blood sugar 3-4 times a day, Disp: 100 each, Rfl: 5  •  levothyroxine (SYNTHROID, LEVOTHROID) 137 MCG tablet, Take 1 tablet by mouth Daily., Disp: 90 tablet, Rfl: 1  •  nitroglycerin (NITROSTAT) 0.4 MG SL tablet, , Disp: , Rfl:   •  pantoprazole (PROTONIX) 40 MG EC tablet, , Disp: , Rfl:   •  pioglitazone (ACTOS) 45 MG tablet, Take 1 tablet by mouth Daily., Disp: 90 tablet, Rfl: 1  •  potassium chloride (MICRO-K) 10 MEQ CR capsule, , Disp: , Rfl:   •  valsartan (DIOVAN) 320 MG tablet, , Disp: , Rfl:     Review of Systems  Review of Systems   Constitutional: Negative for chills, fatigue, fever and unexpected weight change.   HENT: Positive for hearing loss. Negative for ear pain, nosebleeds, rhinorrhea and sore throat.    Eyes: Positive for visual disturbance. Negative for pain, discharge, redness and itching.   Respiratory: Negative for cough, shortness of breath and wheezing.     Cardiovascular: Positive for leg swelling. Negative for chest pain and palpitations.        Slow HR   Gastrointestinal: Negative for abdominal pain, blood in stool, constipation and diarrhea.   Endocrine: Negative for cold intolerance, heat intolerance and polydipsia.   Genitourinary: Negative for dysuria, frequency, hematuria, pelvic pain, vaginal bleeding and vaginal discharge.   Musculoskeletal: Negative for arthralgias, gait problem, joint swelling and myalgias.   Skin: Negative for rash.   Allergic/Immunologic: Negative.    Neurological: Negative for dizziness, syncope, weakness, numbness and headaches.   Hematological: Negative for adenopathy. Bruises/bleeds easily.   Psychiatric/Behavioral: Negative for sleep disturbance and suicidal ideas. The patient is not nervous/anxious.         Physical Exam    /78   Pulse 61   Wt 120 kg (265 lb)   SpO2 97% There is no height or weight on file to calculate BMI.  Physical Exam   Constitutional: She is oriented to person, place, and time. She appears well-developed. No distress.   HENT:   Head: Normocephalic.   Right Ear: External ear normal.   Left Ear: External ear normal.   Mouth/Throat: No oropharyngeal exudate.   Eyes: Conjunctivae and lids are normal. Right eye exhibits no discharge. Left eye exhibits no discharge. Right pupil is reactive. Left pupil is reactive.   Neck: No JVD present. No tracheal deviation present. No thyroid mass and no thyromegaly present.   Cardiovascular: Normal rate, regular rhythm, normal heart sounds and intact distal pulses.   No murmur heard.  Pulmonary/Chest: Effort normal and breath sounds normal. No respiratory distress. She has no wheezes.   Abdominal: Soft. Bowel sounds are normal. There is no tenderness.   Musculoskeletal: She exhibits no edema or tenderness.   Lymphadenopathy:     She has no cervical adenopathy.   Neurological: She is alert and oriented to person, place, and time.   Skin: Skin is warm, dry and intact. No  rash noted. She is not diaphoretic. No erythema.   Psychiatric: She has a normal mood and affect. Her speech is normal and behavior is normal. Thought content normal.       Labs and Imaging   Lab Results   Component Value Date    HGBA1C 7.6 09/04/2019    HGBA1C 7.5 06/03/2019    HGBA1C 7.2 02/27/2019     Office Visit on 09/04/2019   Component Date Value Ref Range Status   • Glucose 09/04/2019 147* 70 - 130 mg/dL Final   • Hemoglobin A1C 09/04/2019 7.6  % Final     No images are attached to the encounter or orders placed in the encounter.  No Images in the past 120 days found..    Assessment / Plan   Emily was seen today for diabetes.    Diagnoses and all orders for this visit:    Uncontrolled type 2 diabetes mellitus with hyperglycemia (CMS/[facility])  -     POC Glucose Fingerstick  -     POC Glycosylated Hemoglobin (Hb A1C)  -     Discontinue: Insulin Glargine (TOUJEO SOLOSTAR) 300 UNIT/ML solution pen-injector injection; Inject 45 Units under the skin into the appropriate area as directed every night at bedtime.  -     glyBURIDE (DIAbeta) 5 MG tablet; Take 2 tablets by mouth 2 (Two) Times a Day With Meals.  -     glucose blood (ACCU-CHEK JLUIS PLUS) test strip; Use as instructed to test blood sugar 3-4 times per day  -     Insulin Pen Needle 32G X 4 MM misc; Use QD  -     pioglitazone (ACTOS) 45 MG tablet; Take 1 tablet by mouth Daily.  -     Insulin Glargine (TOUJEO SOLOSTAR) 300 UNIT/ML solution pen-injector injection; Inject 45-50 Units under the skin into the appropriate area as directed every night at bedtime. As directed    Acquired hypothyroidism  -     levothyroxine (SYNTHROID, LEVOTHROID) 137 MCG tablet; Take 1 tablet by mouth Daily.        Diabetes Mellitus 2 is now under fair control  -A1c 7.6  -rare AM hypoglycemia  -had hyperglycemia for a few weeks after shift change at work due to change in diet. Doing much better now.   -continue toujeo 50U qhs. She does not want to decrease dose. When she eats snack in  the evening she does not have hypoglycemia in AM.   -cont actos, glyburide  -emphasis on taking glyburide AC to avoid hypoglycemia  -call if having issues  -will obtain lipid panel from pcp from august  -microalb/cr next visit      1.  Diet: 3-4 carb servings per meal for females, 4-5 carb servings per meal for males  Spread carb intake throughout the day  Increase lean protein and vegetable intake  Avoid sugary drinks and processed carbs including crackers, cookies, cakes  2.  Exercise: Recommend at least 30 minutes of exercise daily, at least 5 days per week. Increase exercise gradually.   3.  Blood Glucose Goal: Blood glucose goal <150 fasting, <180 2 hr postprandial  4.  Microalbumin due 10/2018  5.  Education performed during this visit: long term diabetic complications discussed. , annual eye examinations at Ophthalmology discussed, dental hygiene discussed  and foot care reviewed., home glucose monitoring emphasized, all medications, side effects and compliance discussed carefully and Hypoglycemia management and prevention reviewed. Reviewed ‘ABCs’ of diabetes management (respective goals in parentheses):  A1C (<7), blood pressure (<130/80), and cholesterol (LDL <100, if CVD <70).    Hypothyroidism  -cont levothyroxine 137mcg daily.  -normal TFTs 2/2019  -TFTs next visit      There are no Patient Instructions on file for this visit.    Follow up: Return in about 3 months (around 12/4/2019).     Discussed the nature of the disease including, risks, complications, implications, management, safe and proper use of medications. Encouraged therapeutic lifestyle changes including low calorie diet with plenty of fruits and vegetables, daily exercise, medication compliance, and keeping scheduled follow up appointments with me and any other providers. Encouraged patient to have appointment for complete physical, fasting labs, appropriate screenings, and immunizations on an annual basis.    Awilda Conn,  KAILEE

## 2019-12-11 ENCOUNTER — OFFICE VISIT (OUTPATIENT)
Dept: ENDOCRINOLOGY | Facility: CLINIC | Age: 59
End: 2019-12-11

## 2019-12-11 VITALS
WEIGHT: 272.2 LBS | OXYGEN SATURATION: 100 % | HEART RATE: 64 BPM | DIASTOLIC BLOOD PRESSURE: 18 MMHG | SYSTOLIC BLOOD PRESSURE: 118 MMHG

## 2019-12-11 DIAGNOSIS — E11.65 UNCONTROLLED TYPE 2 DIABETES MELLITUS WITH HYPERGLYCEMIA (HCC): Primary | ICD-10-CM

## 2019-12-11 DIAGNOSIS — E11.42 DIABETIC PERIPHERAL NEUROPATHY ASSOCIATED WITH TYPE 2 DIABETES MELLITUS (HCC): ICD-10-CM

## 2019-12-11 DIAGNOSIS — E03.9 ACQUIRED HYPOTHYROIDISM: ICD-10-CM

## 2019-12-11 LAB
ALBUMIN SERPL-MCNC: 4.1 G/DL (ref 3.5–5.2)
ALBUMIN/GLOB SERPL: 1 G/DL
ALP SERPL-CCNC: 110 U/L (ref 39–117)
ALT SERPL W P-5'-P-CCNC: 11 U/L (ref 1–33)
ANION GAP SERPL CALCULATED.3IONS-SCNC: 11.3 MMOL/L (ref 5–15)
AST SERPL-CCNC: 13 U/L (ref 1–32)
BILIRUB SERPL-MCNC: 0.4 MG/DL (ref 0.2–1.2)
BUN BLD-MCNC: 25 MG/DL (ref 6–20)
BUN/CREAT SERPL: 16.6 (ref 7–25)
CALCIUM SPEC-SCNC: 10.1 MG/DL (ref 8.6–10.5)
CHLORIDE SERPL-SCNC: 97 MMOL/L (ref 98–107)
CHOLEST SERPL-MCNC: 143 MG/DL (ref 0–200)
CO2 SERPL-SCNC: 27.7 MMOL/L (ref 22–29)
CREAT BLD-MCNC: 1.51 MG/DL (ref 0.57–1)
GFR SERPL CREATININE-BSD FRML MDRD: 36 ML/MIN/1.73
GLOBULIN UR ELPH-MCNC: 4.2 GM/DL
GLUCOSE BLD-MCNC: 153 MG/DL (ref 65–99)
GLUCOSE BLDC GLUCOMTR-MCNC: 162 MG/DL (ref 70–130)
HBA1C MFR BLD: 8.3 %
HDLC SERPL-MCNC: 40 MG/DL (ref 40–60)
LDLC SERPL CALC-MCNC: 83 MG/DL (ref 0–100)
LDLC/HDLC SERPL: 2.08 {RATIO}
POTASSIUM BLD-SCNC: 4.7 MMOL/L (ref 3.5–5.2)
PROT SERPL-MCNC: 8.3 G/DL (ref 6–8.5)
SODIUM BLD-SCNC: 136 MMOL/L (ref 136–145)
T4 FREE SERPL-MCNC: 1.67 NG/DL (ref 0.93–1.7)
TRIGL SERPL-MCNC: 99 MG/DL (ref 0–150)
TSH SERPL DL<=0.05 MIU/L-ACNC: 1.36 UIU/ML (ref 0.27–4.2)
VLDLC SERPL-MCNC: 19.8 MG/DL (ref 5–40)

## 2019-12-11 PROCEDURE — 80061 LIPID PANEL: CPT | Performed by: PHYSICIAN ASSISTANT

## 2019-12-11 PROCEDURE — 82043 UR ALBUMIN QUANTITATIVE: CPT | Performed by: PHYSICIAN ASSISTANT

## 2019-12-11 PROCEDURE — 80053 COMPREHEN METABOLIC PANEL: CPT | Performed by: PHYSICIAN ASSISTANT

## 2019-12-11 PROCEDURE — 82947 ASSAY GLUCOSE BLOOD QUANT: CPT | Performed by: PHYSICIAN ASSISTANT

## 2019-12-11 PROCEDURE — 84443 ASSAY THYROID STIM HORMONE: CPT | Performed by: PHYSICIAN ASSISTANT

## 2019-12-11 PROCEDURE — 82570 ASSAY OF URINE CREATININE: CPT | Performed by: PHYSICIAN ASSISTANT

## 2019-12-11 PROCEDURE — 83036 HEMOGLOBIN GLYCOSYLATED A1C: CPT | Performed by: PHYSICIAN ASSISTANT

## 2019-12-11 PROCEDURE — 84439 ASSAY OF FREE THYROXINE: CPT | Performed by: PHYSICIAN ASSISTANT

## 2019-12-11 PROCEDURE — 99214 OFFICE O/P EST MOD 30 MIN: CPT | Performed by: PHYSICIAN ASSISTANT

## 2019-12-11 NOTE — PROGRESS NOTES
"Chief Complaint  F/u for Diabetes Mellitus and Hypothyroidism.    HPI   Emily Valdez is a 59 y.o. female who is here today for f/u of Diabetes Mellitus type 2 and hypothyroidism. The initial diagnosis of diabetes was made at age 34.    Taking care of her mother who has had two strokes.     A1C- 8.3 (12/11/19), 7.6 (9/4/19), 7.5 (6/3/19), 7.2 (2/27/19), 12 (10/11/18), 7.3 (9/18/17)    Diabetic complications: cardiovascular disease s/p multiple MIs followed by Dr Gagnon. She has tingling in both feet x 20 years.   Eye exam current (within one year): utd. 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   Foot care and dental care: discussed    Current diabetic medications include:  Actos 45mg qd  Glyburide 5mg 2 tab BID.  Toujeo 50U QHS   Statin: lipitor 80    Past medications: Byetta (n/v), metformin (dc d/t decreased renal fxn)    Diabetic Monitoring  -   Checking 2x/day  fasting mostly 100-150, during the day mostly 150-200s  No hypoglycemia    Nutrition:     Current diet: in general, an \"unhealthy\" diet- has improved diet since last visit. Eat low carb.   Current exercise: none  Seen RD in past: no    Hypothyroidism- dx in her late 20s, on levothyroxine 137mcg daily. No recent dose increase.   She feels well for the most part.  Fatigued on the days she works. Job is busy, works as a  at [employer].   No new hair loss, dry skin, constipation, depression.     The following portions of the patient's history were reviewed and updated by me as appropriate: allergies, current medications, past family history, past social history, past surgical history and problem list.    History reviewed. No pertinent past medical history.    Medications    Current Outpatient Medications:   •  Alcohol Swabs 70 % pads, Use qd, Disp: 100 each, Rfl: 6  •  amitriptyline (ELAVIL) 10 MG tablet, , Disp: , Rfl:   •  amLODIPine (NORVASC) 10 MG tablet, , Disp: , Rfl:   •  aspirin 81 MG EC tablet, Take 81 mg by mouth Daily., Disp: , Rfl:   •  " atorvastatin (LIPITOR) 80 MG tablet, , Disp: , Rfl:   •  bisoprolol (ZEBeta) 5 MG tablet, , Disp: , Rfl:   •  Blood Glucose Monitoring Suppl (ACCU-CHEK JLUIS PLUS) w/Device kit, Use device to test blood sugar 3-4 times a day, Disp: 1 kit, Rfl: 0  •  BRILINTA 90 MG tablet tablet, , Disp: , Rfl:   •  furosemide (LASIX) 40 MG tablet, , Disp: , Rfl:   •  glucose blood (ACCU-CHEK JLUIS PLUS) test strip, Use as instructed to test blood sugar 3-4 times per day, Disp: 300 each, Rfl: 3  •  glyBURIDE (DIAbeta) 5 MG tablet, Take 2 tablets by mouth 2 (Two) Times a Day With Meals., Disp: 360 tablet, Rfl: 1  •  Insulin Glargine (TOUJEO SOLOSTAR) 300 UNIT/ML solution pen-injector injection, Inject 45-50 Units under the skin into the appropriate area as directed every night at bedtime. As directed, Disp: 12 pen, Rfl: 1  •  Insulin Pen Needle 32G X 4 MM misc, Use QD, Disp: 150 each, Rfl: 3  •  Lancets 30G misc, Use as directed to test blood sugar 3-4 times a day, Disp: 100 each, Rfl: 5  •  levothyroxine (SYNTHROID, LEVOTHROID) 137 MCG tablet, Take 1 tablet by mouth Daily., Disp: 90 tablet, Rfl: 1  •  nitroglycerin (NITROSTAT) 0.4 MG SL tablet, , Disp: , Rfl:   •  pantoprazole (PROTONIX) 40 MG EC tablet, , Disp: , Rfl:   •  pioglitazone (ACTOS) 45 MG tablet, Take 1 tablet by mouth Daily., Disp: 90 tablet, Rfl: 1  •  potassium chloride (MICRO-K) 10 MEQ CR capsule, , Disp: , Rfl:   •  valsartan (DIOVAN) 320 MG tablet, , Disp: , Rfl:   •  linagliptin (TRADJENTA) 5 MG tablet tablet, Take 1 tablet by mouth Daily., Disp: 90 tablet, Rfl: 1    Review of Systems  Review of Systems   Constitutional: Negative for chills, fatigue, fever and unexpected weight change.   HENT: Positive for hearing loss. Negative for ear pain, nosebleeds, rhinorrhea and sore throat.    Eyes: Positive for visual disturbance. Negative for pain, discharge, redness and itching.   Respiratory: Negative for cough, shortness of breath and wheezing.    Cardiovascular:  Positive for leg swelling. Negative for chest pain and palpitations.        Slow HR   Gastrointestinal: Negative for abdominal pain, blood in stool, constipation and diarrhea.   Endocrine: Negative for cold intolerance, heat intolerance and polydipsia.   Genitourinary: Negative for dysuria, frequency, hematuria, pelvic pain, vaginal bleeding and vaginal discharge.   Musculoskeletal: Negative for arthralgias, gait problem, joint swelling and myalgias.   Skin: Negative for rash.   Allergic/Immunologic: Negative.    Neurological: Negative for dizziness, syncope, weakness, numbness and headaches.   Hematological: Negative for adenopathy. Bruises/bleeds easily.   Psychiatric/Behavioral: Negative for sleep disturbance and suicidal ideas. The patient is not nervous/anxious.         Physical Exam    BP (!) 118/18   Pulse 64   Wt 123 kg (272 lb 3.2 oz)   SpO2 100% There is no height or weight on file to calculate BMI.  Physical Exam   Constitutional: She is oriented to person, place, and time. She appears well-developed. No distress.   HENT:   Head: Normocephalic.   Right Ear: External ear normal.   Left Ear: External ear normal.   Mouth/Throat: No oropharyngeal exudate.   Eyes: Conjunctivae and lids are normal. Right eye exhibits no discharge. Left eye exhibits no discharge. Right pupil is reactive. Left pupil is reactive.   Neck: No JVD present. No tracheal deviation present. No thyroid mass and no thyromegaly present.   Cardiovascular: Normal rate, regular rhythm, normal heart sounds and intact distal pulses.   No murmur heard.  Pulmonary/Chest: Effort normal and breath sounds normal. No respiratory distress. She has no wheezes.   Abdominal: Soft. Bowel sounds are normal. There is no tenderness.   Musculoskeletal: She exhibits no edema or tenderness.    Emily had a diabetic foot exam performed today.   During the foot exam she had a monofilament test performed.    Neurological Sensory Findings -  Altered sharp/dull  right ankle/foot discrimination and altered sharp/dull left ankle/foot discrimination.  Vascular Status -  Her right foot exhibits normal foot vasculature  and no edema. Her left foot exhibits normal foot vasculature  and no edema.  Skin Integrity  -  Her right foot skin is intact. She has callous right foot.  She has no right foot onychomycosis and no right foot ulcer.Her left foot skin is intact.She has callous left foot. She has no left foot onychomycosis and no left foot ulcer..  Lymphadenopathy:     She has no cervical adenopathy.   Neurological: She is alert and oriented to person, place, and time.   Skin: Skin is warm, dry and intact. No rash noted. She is not diaphoretic. No erythema.   Psychiatric: She has a normal mood and affect. Her speech is normal and behavior is normal. Thought content normal.       Labs and Imaging   Lab Results   Component Value Date    HGBA1C 8.3 12/11/2019    HGBA1C 7.6 09/04/2019    HGBA1C 7.5 06/03/2019     Office Visit on 12/11/2019   Component Date Value Ref Range Status   • Glucose 12/11/2019 162* 70 - 130 mg/dL Final   • Hemoglobin A1C 12/11/2019 8.3  % Final     No images are attached to the encounter or orders placed in the encounter.  No Images in the past 120 days found..    Assessment / Plan   Emily was seen today for follow-up.    Diagnoses and all orders for this visit:    Uncontrolled type 2 diabetes mellitus with hyperglycemia (CMS/[facility])  -     POC Glucose Fingerstick  -     POC Glycosylated Hemoglobin (Hb A1C)  -     linagliptin (TRADJENTA) 5 MG tablet tablet; Take 1 tablet by mouth Daily.  -     Microalbumin / Creatinine Urine Ratio - Urine, Clean Catch  -     Comprehensive Metabolic Panel  -     Lipid Panel    Acquired hypothyroidism  -     TSH  -     T4, Free    Diabetic peripheral neuropathy associated with type 2 diabetes mellitus (CMS/[facility])        Diabetes Mellitus 2 is now under fair control  -A1c 8.3, up from 7.6 9/2019  -evening hyperglycemia  -add tradjenta  5mg daily  -continue toujeo 50U qhs.   -cont actos, glyburide  -emphasis on taking glyburide AC to avoid hypoglycemia  -discussed with pt next step is prandial insulin  -fasting labs today      1.  Diet: 3-4 carb servings per meal for females, 4-5 carb servings per meal for males  Spread carb intake throughout the day  Increase lean protein and vegetable intake  Avoid sugary drinks and processed carbs including crackers, cookies, cakes  2.  Exercise: Recommend at least 30 minutes of exercise daily, at least 5 days per week. Increase exercise gradually.   3.  Blood Glucose Goal: Blood glucose goal <150 fasting, <180 2 hr postprandial  4.  Microalbumin due 10/2019  5.  Education performed during this visit: long term diabetic complications discussed. , annual eye examinations at Ophthalmology discussed, dental hygiene discussed  and foot care reviewed., home glucose monitoring emphasized, all medications, side effects and compliance discussed carefully and Hypoglycemia management and prevention reviewed. Reviewed ‘ABCs’ of diabetes management (respective goals in parentheses):  A1C (<7), blood pressure (<130/80), and cholesterol (LDL <100, if CVD <70).    Hypothyroidism  -cont levothyroxine 137mcg daily.  -check TFTs     Peripheral neuropathy  -controlled  -discussed foot care      There are no Patient Instructions on file for this visit.    Follow up: Return in about 3 months (around 3/11/2020).     Discussed the nature of the disease including, risks, complications, implications, management, safe and proper use of medications. Encouraged therapeutic lifestyle changes including low calorie diet with plenty of fruits and vegetables, daily exercise, medication compliance, and keeping scheduled follow up appointments with me and any other providers. Encouraged patient to have appointment for complete physical, fasting labs, appropriate screenings, and immunizations on an annual basis.    Awilda Conn PA-C

## 2019-12-12 LAB
ALBUMIN UR-MCNC: <1.2 MG/DL
CREAT UR-MCNC: 29.8 MG/DL
MICROALBUMIN/CREAT UR: NORMAL MG/G{CREAT}

## 2020-03-16 ENCOUNTER — OFFICE VISIT (OUTPATIENT)
Dept: ENDOCRINOLOGY | Facility: CLINIC | Age: 60
End: 2020-03-16

## 2020-03-16 VITALS
WEIGHT: 283.3 LBS | TEMPERATURE: 96.8 F | DIASTOLIC BLOOD PRESSURE: 80 MMHG | HEART RATE: 66 BPM | SYSTOLIC BLOOD PRESSURE: 120 MMHG | OXYGEN SATURATION: 99 %

## 2020-03-16 DIAGNOSIS — E03.9 ACQUIRED HYPOTHYROIDISM: ICD-10-CM

## 2020-03-16 DIAGNOSIS — E11.65 UNCONTROLLED TYPE 2 DIABETES MELLITUS WITH HYPERGLYCEMIA (HCC): Primary | ICD-10-CM

## 2020-03-16 DIAGNOSIS — E11.42 DIABETIC PERIPHERAL NEUROPATHY ASSOCIATED WITH TYPE 2 DIABETES MELLITUS (HCC): ICD-10-CM

## 2020-03-16 LAB
GLUCOSE BLDC GLUCOMTR-MCNC: 147 MG/DL (ref 70–130)
HBA1C MFR BLD: 7.9 %

## 2020-03-16 PROCEDURE — 99214 OFFICE O/P EST MOD 30 MIN: CPT | Performed by: PHYSICIAN ASSISTANT

## 2020-03-16 PROCEDURE — 83036 HEMOGLOBIN GLYCOSYLATED A1C: CPT | Performed by: PHYSICIAN ASSISTANT

## 2020-03-16 PROCEDURE — 82947 ASSAY GLUCOSE BLOOD QUANT: CPT | Performed by: PHYSICIAN ASSISTANT

## 2020-03-16 RX ORDER — PIOGLITAZONEHYDROCHLORIDE 45 MG/1
45 TABLET ORAL DAILY
Qty: 90 TABLET | Refills: 1 | Status: SHIPPED | OUTPATIENT
Start: 2020-03-16 | End: 2020-11-10 | Stop reason: SDUPTHER

## 2020-03-16 RX ORDER — INSULIN LISPRO 100 [IU]/ML
INJECTION, SOLUTION INTRAVENOUS; SUBCUTANEOUS
Qty: 6 PEN | Refills: 1 | Status: SHIPPED | OUTPATIENT
Start: 2020-03-16 | End: 2020-07-24

## 2020-03-16 RX ORDER — GLYBURIDE 5 MG/1
TABLET ORAL
Qty: 180 TABLET | Refills: 1 | Status: SHIPPED | OUTPATIENT
Start: 2020-03-16 | End: 2020-11-10

## 2020-03-16 NOTE — PATIENT INSTRUCTIONS
Take glyburide 5mg 2 pills before breakfast only. This medication needs to be taken before food to avoid low sugars.  Start humalog 12 units before lunch. After a week increase to 15 units if no low sugars (<70).  Humalog and glyburide should not be taken at the same time. Take glyburide before breakfast and humalog before lunch.  Continue pioglitazone 45mg daily, Toujeo 50 units nightly.  Stop Tradjenta.

## 2020-03-16 NOTE — PROGRESS NOTES
"Chief Complaint  F/u for Diabetes Mellitus and Hypothyroidism.    HPI   Emily Valdez is a 59 y.o. female with CKD 3 who is here today for f/u of Diabetes Mellitus type 2 and hypothyroidism. The initial diagnosis of diabetes was made at age 34.    A1C- 7.9 (3/16/2020), 8.3 (12/11/19), 7.6 (9/4/19), 7.5 (6/3/19), 7.2 (2/27/19), 12 (10/11/18), 7.3 (9/18/17)    Diabetic complications: cardiovascular disease s/p multiple MIs followed by Dr Gagnon. She has tingling in both feet x 20 years.   Eye exam current (within one year): utd. 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   Foot care and dental care: discussed    Current diabetic medications include:  Actos 45mg qd - tolerating well  Glyburide 5mg 2 tab BID- she takes this in the morning but not always before eating and then takes at bedtime  Tradjenta 5mg daily  Toujeo 50U QHS   Statin: lipitor 80    Past medications: Byetta (n/v), metformin (dc d/t decreased renal fxn)    Diabetic Monitoring  -   Checking 2x/day  fasting mostly , in the evenings high 100s to 200s  No hypoglycemia    Nutrition:     Current diet: in general, an \"unhealthy\" diet- has improved diet since last visit. Eat low carb.   Current exercise: none  Seen RD in past: no    Hypothyroidism- dx in her late 20s, on levothyroxine 137mcg daily. No recent dose increase.     The following portions of the patient's history were reviewed and updated by me as appropriate: allergies, current medications, past family history, past social history, past surgical history and problem list.        History reviewed. No pertinent past medical history.    Medications    Current Outpatient Medications:   •  Alcohol Swabs 70 % pads, Use qd, Disp: 100 each, Rfl: 6  •  amitriptyline (ELAVIL) 10 MG tablet, , Disp: , Rfl:   •  amLODIPine (NORVASC) 10 MG tablet, , Disp: , Rfl:   •  aspirin 81 MG EC tablet, Take 81 mg by mouth Daily., Disp: , Rfl:   •  atorvastatin (LIPITOR) 80 MG tablet, , Disp: , Rfl:   •  bisoprolol (ZEBeta) 5 MG " tablet, , Disp: , Rfl:   •  Blood Glucose Monitoring Suppl (ACCU-CHEK JLUIS PLUS) w/Device kit, Use device to test blood sugar 3-4 times a day, Disp: 1 kit, Rfl: 0  •  BRILINTA 90 MG tablet tablet, , Disp: , Rfl:   •  furosemide (LASIX) 40 MG tablet, , Disp: , Rfl:   •  glucose blood (ACCU-CHEK JLUIS PLUS) test strip, Use as instructed to test blood sugar 3-4 times per day, Disp: 300 each, Rfl: 3  •  glyburide (DIAbeta) 5 MG tablet, Take 2 pills daily before breakfast. Must take AC to avoid hypoglycemia, Disp: 180 tablet, Rfl: 1  •  Insulin Pen Needle 32G X 4 MM misc, Use QD, Disp: 150 each, Rfl: 3  •  Lancets 30G misc, Use as directed to test blood sugar 3-4 times a day, Disp: 100 each, Rfl: 5  •  levothyroxine (SYNTHROID, LEVOTHROID) 137 MCG tablet, Take 1 tablet by mouth Daily., Disp: 90 tablet, Rfl: 1  •  nitroglycerin (NITROSTAT) 0.4 MG SL tablet, , Disp: , Rfl:   •  pantoprazole (PROTONIX) 40 MG EC tablet, , Disp: , Rfl:   •  pioglitazone (ACTOS) 45 MG tablet, Take 1 tablet by mouth Daily., Disp: 90 tablet, Rfl: 1  •  potassium chloride (MICRO-K) 10 MEQ CR capsule, , Disp: , Rfl:   •  valsartan (DIOVAN) 320 MG tablet, , Disp: , Rfl:   •  Insulin Glargine, 1 Unit Dial, (TOUJEO SOLOSTAR) 300 UNIT/ML solution pen-injector injection, Take 50u qhs, Disp: 10 pen, Rfl: 1  •  Insulin Lispro, 1 Unit Dial, (HUMALOG KWIKPEN) 100 UNIT/ML solution pen-injector, Take 12 units before lunch, after a week increase to 15 units if no hypoglycemia, Disp: 6 pen, Rfl: 1    Review of Systems  Review of Systems   Constitutional: Negative for chills, fatigue, fever and unexpected weight change.   HENT: Positive for hearing loss. Negative for ear pain, nosebleeds, rhinorrhea and sore throat.    Eyes: Negative for pain, discharge, redness and itching.   Respiratory: Negative for cough, shortness of breath and wheezing.    Cardiovascular: Positive for leg swelling. Negative for chest pain and palpitations.   Gastrointestinal: Negative  for abdominal pain, blood in stool, constipation and diarrhea.   Endocrine: Negative for cold intolerance, heat intolerance and polydipsia.   Genitourinary: Negative for dysuria, frequency, hematuria, pelvic pain, vaginal bleeding and vaginal discharge.   Musculoskeletal: Negative for arthralgias, gait problem, joint swelling and myalgias.   Skin: Negative for rash.   Allergic/Immunologic: Negative.    Neurological: Negative for dizziness, syncope, weakness, numbness and headaches.   Hematological: Negative for adenopathy.   Psychiatric/Behavioral: Negative for sleep disturbance and suicidal ideas. The patient is not nervous/anxious.         Physical Exam    /80   Pulse 66   Temp 96.8 °F (36 °C)   Wt 129 kg (283 lb 4.8 oz)   SpO2 99% There is no height or weight on file to calculate BMI.  Physical Exam   Constitutional: She is oriented to person, place, and time. She appears well-developed. No distress.   HENT:   Head: Normocephalic.   Right Ear: External ear normal.   Left Ear: External ear normal.   Mouth/Throat: No oropharyngeal exudate.   Eyes: Conjunctivae and lids are normal. Right eye exhibits no discharge. Left eye exhibits no discharge. Right pupil is reactive. Left pupil is reactive.   Neck: No JVD present. No tracheal deviation present. No thyroid mass and no thyromegaly present.   Cardiovascular: Normal rate, regular rhythm, normal heart sounds and intact distal pulses.   No murmur heard.  Pulmonary/Chest: Effort normal and breath sounds normal. No respiratory distress. She has no wheezes.   Abdominal: Soft. Bowel sounds are normal. There is no tenderness.   Musculoskeletal: She exhibits no edema or tenderness.   Lymphadenopathy:     She has no cervical adenopathy.   Neurological: She is alert and oriented to person, place, and time.   Skin: Skin is warm, dry and intact. No rash noted. She is not diaphoretic. No erythema.   Psychiatric: She has a normal mood and affect. Her speech is normal and  behavior is normal. Thought content normal.       Labs and Imaging   Lab Results   Component Value Date    HGBA1C 7.9 03/16/2020    HGBA1C 8.3 12/11/2019    HGBA1C 7.6 09/04/2019     Office Visit on 03/16/2020   Component Date Value Ref Range Status   • Glucose 03/16/2020 147* 70 - 130 mg/dL Final   • Hemoglobin A1C 03/16/2020 7.9  % Final     No images are attached to the encounter or orders placed in the encounter.  No Images in the past 120 days found..    Assessment / Plan   Emily was seen today for follow-up.    Diagnoses and all orders for this visit:    Uncontrolled type 2 diabetes mellitus with hyperglycemia (CMS/[facility])  -     POC Glucose Fingerstick  -     POC Glycosylated Hemoglobin (Hb A1C)  -     Insulin Lispro, 1 Unit Dial, (HUMALOG KWIKPEN) 100 UNIT/ML solution pen-injector; Take 12 units before lunch, after a week increase to 15 units if no hypoglycemia  -     Insulin Glargine, 1 Unit Dial, (TOUJEO SOLOSTAR) 300 UNIT/ML solution pen-injector injection; Take 50u qhs  -     pioglitazone (ACTOS) 45 MG tablet; Take 1 tablet by mouth Daily.  -     glyburide (DIAbeta) 5 MG tablet; Take 2 pills daily before breakfast. Must take AC to avoid hypoglycemia    Acquired hypothyroidism    Diabetic peripheral neuropathy associated with type 2 diabetes mellitus (CMS/[facility])        Diabetes Mellitus 2 is now under fair control  -with peripheral neuropathy, CAD, CKD3  -A1c 7.9, down from 8.3 last visit  -evening hyperglycemia  -discussed need to add prandial insulin with largest meal  -add humalog 12u before lunch and after a week increase to 15u. This is her main meal of the day  -dc tradjenta 5mg daily, minimal efficacy   -continue toujeo 50U qhs.   -cont actos  -take glycuride 5mg 2 tab in the morning before food only      1.  Diet: 3-4 carb servings per meal for females, 4-5 carb servings per meal for males  Spread carb intake throughout the day  Increase lean protein and vegetable intake  Avoid sugary drinks and  processed carbs including crackers, cookies, cakes  2.  Exercise: Recommend at least 30 minutes of exercise daily, at least 5 days per week. Increase exercise gradually.   3.  Blood Glucose Goal: Blood glucose goal <150 fasting, <180 2 hr postprandial  4.  Microalbumin due 12/2020  5.  Education performed during this visit: long term diabetic complications discussed. , annual eye examinations at Ophthalmology discussed, dental hygiene discussed  and foot care reviewed., home glucose monitoring emphasized, all medications, side effects and compliance discussed carefully and Hypoglycemia management and prevention reviewed. Reviewed ‘ABCs’ of diabetes management (respective goals in parentheses):  A1C (<7), blood pressure (<130/80), and cholesterol (LDL <100, if CVD <70).    Hypothyroidism  -cont levothyroxine 137mcg daily.  -TFTs wnl 12/2019, stable on current dose, repeat next visit    Peripheral neuropathy  -controlled  -discussed foot care      Patient Instructions   Take glyburide 5mg 2 pills before breakfast only. This medication needs to be taken before food to avoid low sugars.  Start humalog 12 units before lunch. After a week increase to 15 units if no low sugars (<70).  Humalog and glyburide should not be taken at the same time. Take glyburide before breakfast and humalog before lunch.  Continue pioglitazone 45mg daily, Toujeo 50 units nightly.  Stop Tradjenta.       Follow up: Return in about 3 months (around 6/16/2020).     Discussed the nature of the disease including, risks, complications, implications, management, safe and proper use of medications. Encouraged therapeutic lifestyle changes including low calorie diet with plenty of fruits and vegetables, daily exercise, medication compliance, and keeping scheduled follow up appointments with me and any other providers. Encouraged patient to have appointment for complete physical, fasting labs, appropriate screenings, and immunizations on an annual  basis.    Awilda Conn PA-C

## 2020-07-24 DIAGNOSIS — E11.65 UNCONTROLLED TYPE 2 DIABETES MELLITUS WITH HYPERGLYCEMIA (HCC): ICD-10-CM

## 2020-07-24 RX ORDER — INSULIN LISPRO 100 [IU]/ML
INJECTION, SOLUTION INTRAVENOUS; SUBCUTANEOUS
Qty: 6 PEN | Refills: 1 | Status: SHIPPED | OUTPATIENT
Start: 2020-07-24 | End: 2020-08-12 | Stop reason: SDUPTHER

## 2020-08-11 ENCOUNTER — LAB (OUTPATIENT)
Dept: LAB | Facility: HOSPITAL | Age: 60
End: 2020-08-11

## 2020-08-11 ENCOUNTER — OFFICE VISIT (OUTPATIENT)
Dept: ENDOCRINOLOGY | Facility: CLINIC | Age: 60
End: 2020-08-11

## 2020-08-11 VITALS
SYSTOLIC BLOOD PRESSURE: 120 MMHG | BODY MASS INDEX: 53.92 KG/M2 | WEIGHT: 293 LBS | HEART RATE: 59 BPM | HEIGHT: 62 IN | DIASTOLIC BLOOD PRESSURE: 78 MMHG | OXYGEN SATURATION: 97 %

## 2020-08-11 DIAGNOSIS — E11.65 UNCONTROLLED TYPE 2 DIABETES MELLITUS WITH HYPERGLYCEMIA (HCC): Primary | ICD-10-CM

## 2020-08-11 DIAGNOSIS — E03.9 ACQUIRED HYPOTHYROIDISM: ICD-10-CM

## 2020-08-11 DIAGNOSIS — E11.42 DIABETIC PERIPHERAL NEUROPATHY ASSOCIATED WITH TYPE 2 DIABETES MELLITUS (HCC): ICD-10-CM

## 2020-08-11 LAB
ALBUMIN SERPL-MCNC: 4 G/DL (ref 3.5–5.2)
ALBUMIN/GLOB SERPL: 1.1 G/DL
ALP SERPL-CCNC: 102 U/L (ref 39–117)
ALT SERPL W P-5'-P-CCNC: 8 U/L (ref 1–33)
ANION GAP SERPL CALCULATED.3IONS-SCNC: 11.6 MMOL/L (ref 5–15)
AST SERPL-CCNC: 12 U/L (ref 1–32)
BILIRUB SERPL-MCNC: 0.4 MG/DL (ref 0–1.2)
BUN SERPL-MCNC: 16 MG/DL (ref 6–20)
BUN/CREAT SERPL: 12.2 (ref 7–25)
CALCIUM SPEC-SCNC: 9.9 MG/DL (ref 8.6–10.5)
CHLORIDE SERPL-SCNC: 103 MMOL/L (ref 98–107)
CHOLEST SERPL-MCNC: 145 MG/DL (ref 0–200)
CO2 SERPL-SCNC: 25.4 MMOL/L (ref 22–29)
CREAT SERPL-MCNC: 1.31 MG/DL (ref 0.57–1)
EXPIRATION DATE: NORMAL
EXPIRATION DATE: NORMAL
GFR SERPL CREATININE-BSD FRML MDRD: 42 ML/MIN/1.73
GLOBULIN UR ELPH-MCNC: 3.5 GM/DL
GLUCOSE BLDC GLUCOMTR-MCNC: 122 MG/DL (ref 70–130)
GLUCOSE SERPL-MCNC: 114 MG/DL (ref 65–99)
HBA1C MFR BLD: 7.7 %
HDLC SERPL-MCNC: 36 MG/DL (ref 40–60)
LDLC SERPL CALC-MCNC: 89 MG/DL (ref 0–100)
LDLC/HDLC SERPL: 2.47 {RATIO}
Lab: NORMAL
Lab: NORMAL
POTASSIUM SERPL-SCNC: 4.1 MMOL/L (ref 3.5–5.2)
PROT SERPL-MCNC: 7.5 G/DL (ref 6–8.5)
SODIUM SERPL-SCNC: 140 MMOL/L (ref 136–145)
T4 FREE SERPL-MCNC: 1.87 NG/DL (ref 0.93–1.7)
TRIGL SERPL-MCNC: 100 MG/DL (ref 0–150)
TSH SERPL DL<=0.05 MIU/L-ACNC: 0.98 UIU/ML (ref 0.27–4.2)
VLDLC SERPL-MCNC: 20 MG/DL (ref 5–40)

## 2020-08-11 PROCEDURE — 84443 ASSAY THYROID STIM HORMONE: CPT | Performed by: PHYSICIAN ASSISTANT

## 2020-08-11 PROCEDURE — 84439 ASSAY OF FREE THYROXINE: CPT | Performed by: PHYSICIAN ASSISTANT

## 2020-08-11 PROCEDURE — 80061 LIPID PANEL: CPT | Performed by: PHYSICIAN ASSISTANT

## 2020-08-11 PROCEDURE — 82947 ASSAY GLUCOSE BLOOD QUANT: CPT | Performed by: PHYSICIAN ASSISTANT

## 2020-08-11 PROCEDURE — 80053 COMPREHEN METABOLIC PANEL: CPT | Performed by: PHYSICIAN ASSISTANT

## 2020-08-11 PROCEDURE — 83036 HEMOGLOBIN GLYCOSYLATED A1C: CPT | Performed by: PHYSICIAN ASSISTANT

## 2020-08-11 PROCEDURE — 99214 OFFICE O/P EST MOD 30 MIN: CPT | Performed by: PHYSICIAN ASSISTANT

## 2020-08-11 RX ORDER — FLASH GLUCOSE SCANNING READER
1 EACH MISCELLANEOUS CONTINUOUS
Qty: 1 DEVICE | Refills: 0 | Status: SHIPPED | OUTPATIENT
Start: 2020-08-11 | End: 2021-02-22 | Stop reason: ALTCHOICE

## 2020-08-11 RX ORDER — FLASH GLUCOSE SENSOR
1 KIT MISCELLANEOUS
Qty: 6 EACH | Refills: 3 | Status: SHIPPED | OUTPATIENT
Start: 2020-08-11 | End: 2021-02-22 | Stop reason: ALTCHOICE

## 2020-08-11 NOTE — PATIENT INSTRUCTIONS
Stop glyburide    Insulin dosing:  Basal insulin - Continue Toujeo 50 units at bedtime      Meal Insulin- Take Humalog 10 units before breakfast, 15 units before lunch and dinner. Check sugar before you take humalog and if it is above 150 then add extra humalog based on chart below.           Correction insulin (add to meal insulin)   0 unit  if  Blood sugar (BS)  less than 150   1 unit   if BS  150 - 199   2 units if  - 249   3 units if  - 299   4 units if  - 349   5 units if -399   6 units if BS >400

## 2020-08-11 NOTE — PROGRESS NOTES
"Chief Complaint  F/u for Diabetes Mellitus and Hypothyroidism.    HPI   Emily Valdez is a 60 y.o. female with CKD 3 who is here today for f/u of Diabetes Mellitus type 2 and hypothyroidism. The initial diagnosis of diabetes was made at age 34.    A1C- 7.7 (8/11/2020), 7.9 (3/16/2020), 8.3 (12/11/19), 7.6 (9/4/19), 7.5 (6/3/19), 7.2 (2/27/19), 12 (10/11/18), 7.3 (9/18/17)    Diabetic complications: cardiovascular disease s/p multiple MIs followed by Dr Gagnon. She has tingling in both feet x 20 years.   Eye exam current (within one year): utd. 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   Foot care and dental care: discussed    Current diabetic medications include:  Actos 45mg qd - tolerating well  Glyburide 5mg 2 tab BID- she takes this in the morning but not always before eating and then takes at bedtime  Toujeo 50U QHS   Humalog 15 units before lunch  Statin: lipitor 80    Past medications: Byetta (n/v), metformin (dc d/t decreased renal fxn), trajenta    Diabetic Monitoring  -   Checking 2x/day  fasting mostly 110-140, in the evenings high 150s to 200s  No hypoglycemia    Nutrition:     Current diet: in general, an \"unhealthy\" diet- has improved diet since last visit. Eat low carb.   Current exercise: none  Seen RD in past: no    Hypothyroidism- dx in her late 20s, on levothyroxine 137mcg daily. No recent dose increase.     The following portions of the patient's history were reviewed and updated by me as appropriate: allergies, current medications, past family history, past social history, past surgical history and problem list.        History reviewed. No pertinent past medical history.    Medications    Current Outpatient Medications:   •  Alcohol Swabs 70 % pads, Use qd, Disp: 100 each, Rfl: 6  •  amitriptyline (ELAVIL) 10 MG tablet, , Disp: , Rfl:   •  amLODIPine (NORVASC) 10 MG tablet, , Disp: , Rfl:   •  aspirin 81 MG EC tablet, Take 81 mg by mouth Daily., Disp: , Rfl:   •  atorvastatin (LIPITOR) 80 MG tablet, , " Disp: , Rfl:   •  bisoprolol (ZEBeta) 5 MG tablet, , Disp: , Rfl:   •  Blood Glucose Monitoring Suppl (ACCU-CHEK JLUIS PLUS) w/Device kit, Use device to test blood sugar 3-4 times a day, Disp: 1 kit, Rfl: 0  •  BRILINTA 90 MG tablet tablet, , Disp: , Rfl:   •  furosemide (LASIX) 40 MG tablet, , Disp: , Rfl:   •  glucose blood (ACCU-CHEK JLUIS PLUS) test strip, Use as instructed to test blood sugar 3-4 times per day, Disp: 300 each, Rfl: 3  •  glyburide (DIAbeta) 5 MG tablet, Take 2 pills daily before breakfast. Must take AC to avoid hypoglycemia, Disp: 180 tablet, Rfl: 1  •  Insulin Glargine, 1 Unit Dial, (TOUJEO SOLOSTAR) 300 UNIT/ML solution pen-injector injection, Take 50u qhs, Disp: 10 pen, Rfl: 1  •  Insulin Lispro, 1 Unit Dial, (HumaLOG KwikPen) 100 UNIT/ML solution pen-injector, INJECT 12 UNITS BEFORE LUNCH. AFTER 7 DAYS INCREASE TO 15 UNITS IF NO HYPOGLYCEMIA., Disp: 6 pen, Rfl: 1  •  Insulin Pen Needle 32G X 4 MM misc, Use QD, Disp: 150 each, Rfl: 3  •  Lancets 30G misc, Use as directed to test blood sugar 3-4 times a day, Disp: 100 each, Rfl: 5  •  levothyroxine (SYNTHROID, LEVOTHROID) 137 MCG tablet, Take 1 tablet by mouth Daily., Disp: 90 tablet, Rfl: 1  •  nitroglycerin (NITROSTAT) 0.4 MG SL tablet, , Disp: , Rfl:   •  pantoprazole (PROTONIX) 40 MG EC tablet, , Disp: , Rfl:   •  pioglitazone (ACTOS) 45 MG tablet, Take 1 tablet by mouth Daily., Disp: 90 tablet, Rfl: 1  •  potassium chloride (MICRO-K) 10 MEQ CR capsule, , Disp: , Rfl:   •  valsartan (DIOVAN) 320 MG tablet, , Disp: , Rfl:   •  Continuous Blood Gluc  (FREESTYLE CLAUDINE 14 DAY READER) device, 1 each Continuous., Disp: 1 Device, Rfl: 0  •  Continuous Blood Gluc Sensor (FREESTYLE CLAUDINE 14 DAY SENSOR) misc, 1 each Every 14 (Fourteen) Days., Disp: 6 each, Rfl: 3    Review of Systems  Review of Systems   Constitutional: Negative for chills, fatigue, fever and unexpected weight change.   HENT: Positive for hearing loss (chronic). Negative  "for ear pain, nosebleeds, rhinorrhea and sore throat.    Eyes: Negative for pain, discharge, redness and itching.   Respiratory: Negative for cough, shortness of breath and wheezing.    Cardiovascular: Positive for leg swelling (chronic). Negative for chest pain and palpitations.   Gastrointestinal: Negative for abdominal pain, blood in stool, constipation and diarrhea.   Endocrine: Negative for cold intolerance, heat intolerance and polydipsia.   Genitourinary: Negative for dysuria, frequency, hematuria, pelvic pain, vaginal bleeding and vaginal discharge.   Musculoskeletal: Negative for arthralgias, gait problem, joint swelling and myalgias.   Skin: Negative for rash.   Allergic/Immunologic: Negative.    Neurological: Negative for dizziness, syncope, weakness, numbness and headaches.   Hematological: Negative for adenopathy.   Psychiatric/Behavioral: Negative for sleep disturbance and suicidal ideas. The patient is not nervous/anxious.         Physical Exam    /78   Pulse 59   Ht 157.5 cm (62\")   Wt 134 kg (294 lb 6.4 oz)   SpO2 97%   BMI 53.85 kg/m² Body mass index is 53.85 kg/m².  Physical Exam   Constitutional: She is oriented to person, place, and time. She appears well-developed. No distress.   HENT:   Head: Normocephalic.   Right Ear: External ear normal.   Left Ear: External ear normal.   Mouth/Throat: No oropharyngeal exudate.   Eyes: Conjunctivae and lids are normal. Right eye exhibits no discharge. Left eye exhibits no discharge. Right pupil is reactive. Left pupil is reactive.   Neck: No JVD present. No tracheal deviation present. No thyroid mass and no thyromegaly present.   Cardiovascular: Normal rate, regular rhythm, normal heart sounds and intact distal pulses.   No murmur heard.  Pulmonary/Chest: Effort normal and breath sounds normal. No respiratory distress. She has no wheezes.   Abdominal: Soft. Bowel sounds are normal. There is no tenderness.   Musculoskeletal: She exhibits no edema " or tenderness.   Lymphadenopathy:     She has no cervical adenopathy.   Neurological: She is alert and oriented to person, place, and time.   Skin: Skin is warm, dry and intact. No rash noted. She is not diaphoretic. No erythema.   Psychiatric: She has a normal mood and affect. Her speech is normal and behavior is normal. Thought content normal.       Labs and Imaging   Lab Results   Component Value Date    HGBA1C 7.7 08/11/2020    HGBA1C 7.9 03/16/2020    HGBA1C 8.3 12/11/2019     Office Visit on 08/11/2020   Component Date Value Ref Range Status   • Glucose 08/11/2020 122  70 - 130 mg/dL Final   • Lot Number 08/11/2020 [id number]   Final   • Expiration Date 08/11/2020 12/28/2020   Final   • Hemoglobin A1C 08/11/2020 7.7  % Final   • Lot Number 08/11/2020 [id number]   Final   • Expiration Date 08/11/2020 03/13/2022   Final     No images are attached to the encounter or orders placed in the encounter.  No Images in the past 120 days found..    Assessment / Plan   Emily was seen today for diabetes and hypothyroidism.    Diagnoses and all orders for this visit:    Uncontrolled type 2 diabetes mellitus with hyperglycemia (CMS/[facility])  -     POC Glucose Fingerstick  -     POC Glycosylated Hemoglobin (Hb A1C)  -     Comprehensive Metabolic Panel  -     Lipid Panel  -     Continuous Blood Gluc  (FREESTYLE CLAUDINE 14 DAY READER) device; 1 each Continuous.  -     Continuous Blood Gluc Sensor (FREESTYLE CLAUDINE 14 DAY SENSOR) misc; 1 each Every 14 (Fourteen) Days.    Acquired hypothyroidism  -     TSH  -     T4, Free    Diabetic peripheral neuropathy associated with type 2 diabetes mellitus (CMS/[facility])  -     POC Glucose Fingerstick  -     POC Glycosylated Hemoglobin (Hb A1C)        Diabetes Mellitus 2 is now under inadequate control  -with peripheral neuropathy, CAD, CKD3  -A1c 7.7  -continues to take glyburide at bedtime  -she is currently on prandial insulin before lunch only, discussed best if we start prandial insulin  before all meals  -humalog- take 10 units before breakfast, 15u before lunch and dinner, add CF 1:50 >150  -continue toujeo 50U qhs.   -cont actos  -dc glycuride   -discussed importance of checking bs AC and HS and bring meter to f/u. Can fax readings in the meantime if having issues.   -rx freestyle mick      1.  Diet: 3-4 carb servings per meal for females, 4-5 carb servings per meal for males  Spread carb intake throughout the day  Increase lean protein and vegetable intake  Avoid sugary drinks and processed carbs including crackers, cookies, cakes  2.  Exercise: Recommend at least 30 minutes of exercise daily, at least 5 days per week. Increase exercise gradually.   3.  Blood Glucose Goal: Blood glucose goal <150 fasting, <180 2 hr postprandial  4.  Microalbumin due 12/2020  5.  Education performed during this visit: long term diabetic complications discussed. , annual eye examinations at Ophthalmology discussed, dental hygiene discussed  and foot care reviewed., home glucose monitoring emphasized, all medications, side effects and compliance discussed carefully and Hypoglycemia management and prevention reviewed. Reviewed ‘ABCs’ of diabetes management (respective goals in parentheses):  A1C (<7), blood pressure (<130/80), and cholesterol (LDL <100, if CVD <70).    Hypothyroidism  -cont levothyroxine 137mcg daily.  -TFTs today    Peripheral neuropathy  -controlled  -discussed foot care      Patient Instructions     Stop glyburide    Insulin dosing:  Basal insulin -  Continue Toujeo 50 units at bedtime      Meal Insulin-  Take Humalog 10 units before breakfast, 15 units before lunch and dinner. Check sugar before you take humalog and if it is above 150 then add extra humalog based on chart below.           Correction insulin (add to meal insulin)   0 unit  if  Blood sugar (BS)  less than 150   1 unit   if BS  150 - 199   2 units if  - 249   3 units if  - 299   4 units if  - 349   5 units if BS  350-399   6 units if BS >400             Follow up: Return in about 2 months (around 10/11/2020).     Discussed the nature of the disease including, risks, complications, implications, management, safe and proper use of medications. Encouraged therapeutic lifestyle changes including low calorie diet with plenty of fruits and vegetables, daily exercise, medication compliance, and keeping scheduled follow up appointments with me and any other providers. Encouraged patient to have appointment for complete physical, fasting labs, appropriate screenings, and immunizations on an annual basis.    Awilda Conn PA-C

## 2020-08-12 ENCOUNTER — TELEPHONE (OUTPATIENT)
Dept: ENDOCRINOLOGY | Facility: CLINIC | Age: 60
End: 2020-08-12

## 2020-08-12 DIAGNOSIS — E11.65 UNCONTROLLED TYPE 2 DIABETES MELLITUS WITH HYPERGLYCEMIA (HCC): ICD-10-CM

## 2020-08-12 RX ORDER — INSULIN LISPRO 100 [IU]/ML
INJECTION, SOLUTION INTRAVENOUS; SUBCUTANEOUS
Qty: 6 PEN | Refills: 1 | Status: SHIPPED | OUTPATIENT
Start: 2020-08-12 | End: 2020-10-15 | Stop reason: SDUPTHER

## 2020-08-12 RX ORDER — EZETIMIBE 10 MG/1
10 TABLET ORAL DAILY
Qty: 90 TABLET | Refills: 1 | Status: SHIPPED | OUTPATIENT
Start: 2020-08-12 | End: 2021-02-22 | Stop reason: SDUPTHER

## 2020-08-12 NOTE — TELEPHONE ENCOUNTER
[facility] pharmacy called in regards to patients Humalog. Patient states she is using a sliding scale as well but the rx from 7/24 does not state that. Please advise and contact pharmacy. Thanks.

## 2020-08-19 ENCOUNTER — TELEPHONE (OUTPATIENT)
Dept: ENDOCRINOLOGY | Facility: CLINIC | Age: 60
End: 2020-08-19

## 2020-08-19 DIAGNOSIS — E03.9 ACQUIRED HYPOTHYROIDISM: ICD-10-CM

## 2020-08-19 RX ORDER — LEVOTHYROXINE SODIUM 137 UG/1
137 TABLET ORAL DAILY
Qty: 90 TABLET | Refills: 0 | Status: SHIPPED | OUTPATIENT
Start: 2020-08-19 | End: 2020-11-10 | Stop reason: SDUPTHER

## 2020-08-26 ENCOUNTER — TELEPHONE (OUTPATIENT)
Dept: ENDOCRINOLOGY | Facility: CLINIC | Age: 60
End: 2020-08-26

## 2020-09-04 NOTE — TELEPHONE ENCOUNTER
"Pt was informed to increase insulin by 2 units when she has more carbs at lunch. I also discussed what to expect between the meter and the sensors. She is very irritated because she has spend $100 on the [employer] and it's not accurate. I advised her to call the company, she said she already has and they had no suggestions and was not willing to replace anything. She proceeded to say that everything we have done has only cost her money. She said she is sick of it all. She says that her blood sugar is \"up and down, up and down\". I went over the blood sugar ranges that Awilda reviewed and told her that they look good. She said she doesn't write everything down that she's not going to write it down at 2 in the morning when it is low. I asked her if she could give a log with these highs and lows so that we could make appropriate adjustments or would she be willing to come in and talk to Awilda. She said No, She did not want to do anything, she is sick of all of this. I advised her to seek a second opinion if she felt that it would help. She said she does not think it's Awilda's fault but that she is just sick of it. She continued to say this wasn't my problem, it was her and got off the phone.  I did tell her that we would be happy to see her if she changed her mind.   "

## 2020-10-15 DIAGNOSIS — E11.65 UNCONTROLLED TYPE 2 DIABETES MELLITUS WITH HYPERGLYCEMIA (HCC): ICD-10-CM

## 2020-10-15 RX ORDER — INSULIN LISPRO 100 [IU]/ML
INJECTION, SOLUTION INTRAVENOUS; SUBCUTANEOUS
Qty: 6 PEN | Refills: 0 | Status: SHIPPED | OUTPATIENT
Start: 2020-10-15 | End: 2020-11-10 | Stop reason: SDUPTHER

## 2020-10-15 NOTE — TELEPHONE ENCOUNTER
PT CALLED ASKING WE SEND IN A PRESCRIPTION FOR HUMALOG. PLEASE AND THANK YOU   [[email or web address]] : The patient was camping in [place] from 6/4-6/7, sleeping in a tent.  She came home yesterday and felt a pinching on her back.  Early this morning she felt it again and looked and saw an attached tick.  She pulled it off and she has the tick which is alive.  She has no rash at the site.  No fever or other symptoms.  She thinks the tick was attached for at least 36 hours.

## 2020-11-10 ENCOUNTER — OFFICE VISIT (OUTPATIENT)
Dept: ENDOCRINOLOGY | Facility: CLINIC | Age: 60
End: 2020-11-10

## 2020-11-10 ENCOUNTER — LAB (OUTPATIENT)
Dept: LAB | Facility: HOSPITAL | Age: 60
End: 2020-11-10

## 2020-11-10 VITALS
DIASTOLIC BLOOD PRESSURE: 80 MMHG | SYSTOLIC BLOOD PRESSURE: 124 MMHG | BODY MASS INDEX: 54.94 KG/M2 | OXYGEN SATURATION: 97 % | HEART RATE: 60 BPM | WEIGHT: 293 LBS

## 2020-11-10 DIAGNOSIS — E11.42 DIABETIC PERIPHERAL NEUROPATHY ASSOCIATED WITH TYPE 2 DIABETES MELLITUS (HCC): ICD-10-CM

## 2020-11-10 DIAGNOSIS — E03.9 ACQUIRED HYPOTHYROIDISM: ICD-10-CM

## 2020-11-10 DIAGNOSIS — E11.649 UNCONTROLLED TYPE 2 DIABETES MELLITUS WITH HYPOGLYCEMIA WITHOUT COMA (HCC): Primary | ICD-10-CM

## 2020-11-10 LAB
GLUCOSE BLDC GLUCOMTR-MCNC: 123 MG/DL (ref 70–130)
HBA1C MFR BLD: 6.5 %

## 2020-11-10 PROCEDURE — 82043 UR ALBUMIN QUANTITATIVE: CPT | Performed by: PHYSICIAN ASSISTANT

## 2020-11-10 PROCEDURE — 99214 OFFICE O/P EST MOD 30 MIN: CPT | Performed by: PHYSICIAN ASSISTANT

## 2020-11-10 PROCEDURE — 83036 HEMOGLOBIN GLYCOSYLATED A1C: CPT | Performed by: PHYSICIAN ASSISTANT

## 2020-11-10 PROCEDURE — 82947 ASSAY GLUCOSE BLOOD QUANT: CPT | Performed by: PHYSICIAN ASSISTANT

## 2020-11-10 PROCEDURE — 82570 ASSAY OF URINE CREATININE: CPT | Performed by: PHYSICIAN ASSISTANT

## 2020-11-10 RX ORDER — PIOGLITAZONEHYDROCHLORIDE 45 MG/1
45 TABLET ORAL DAILY
Qty: 90 TABLET | Refills: 1 | Status: SHIPPED | OUTPATIENT
Start: 2020-11-10 | End: 2021-02-22 | Stop reason: SDUPTHER

## 2020-11-10 RX ORDER — INSULIN LISPRO 100 [IU]/ML
INJECTION, SOLUTION INTRAVENOUS; SUBCUTANEOUS
Qty: 21 PEN | Refills: 1 | Status: SHIPPED | OUTPATIENT
Start: 2020-11-10 | End: 2021-02-22 | Stop reason: SDUPTHER

## 2020-11-10 RX ORDER — LEVOTHYROXINE SODIUM 137 UG/1
137 TABLET ORAL DAILY
Qty: 90 TABLET | Refills: 1 | Status: SHIPPED | OUTPATIENT
Start: 2020-11-10 | End: 2021-02-23 | Stop reason: SDUPTHER

## 2020-11-10 RX ORDER — INSULIN GLARGINE 300 U/ML
INJECTION, SOLUTION SUBCUTANEOUS
Qty: 10 PEN | Refills: 1 | Status: SHIPPED | OUTPATIENT
Start: 2020-11-10 | End: 2021-01-27 | Stop reason: SDUPTHER

## 2020-11-10 NOTE — PROGRESS NOTES
"Chief Complaint  F/u for Diabetes Mellitus and Hypothyroidism.    HPI   Emily Valdez is a 60 y.o. female with CKD 3 who is here today for f/u of Diabetes Mellitus type 2 and hypothyroidism. The initial diagnosis of diabetes was made at age 34.    A1C- 6.5 (11/10/2020), 7.7 (8/11/2020), 7.9 (3/16/2020), 8.3 (12/11/19), 7.6 (9/4/19), 7.5 (6/3/19), 7.2 (2/27/19), 12 (10/11/18), 7.3 (9/18/17)    Diabetic complications: cardiovascular disease s/p multiple MIs followed by Dr Gagnon. She has tingling in both feet x 20 years.   Eye exam current (within one year): utd. 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   Foot care and dental care: discussed    Current diabetic medications include:  Actos 45mg qd - tolerating well  Toujeo 50U QHS   Humalog 25 units before meals TID  Statin: lipitor 80    Past medications: Byetta (n/v), metformin (dc d/t decreased renal fxn), trajenta, glyburide    Diabetic Monitoring  -   Checking 3-4x/day  fbs 100-150, before meals mostly 120-170 with occasional hyperglycemia in the 200s. She reports hypoglycemia about 2-3 times per month, either during the day or night. She sometimes takes her insulin at work but then doesn't have a chance to eat resulting in hypoglycemia.     Did not like Freestyle Janusz- thought it wasn't accurate     Nutrition:     Current diet: in general, an \"unhealthy\" diet- has improved diet since last visit. Eat low carb.   Current exercise: none  Seen RD in past: no    Hypothyroidism- dx in her late 20s, on levothyroxine 137mcg daily. No recent dose increase.     The following portions of the patient's history were reviewed and updated by me as appropriate: allergies, current medications, past family history, past social history, past surgical history and problem list.        History reviewed. No pertinent past medical history.    Medications    Current Outpatient Medications:   •  Alcohol Swabs 70 % pads, Use qd, Disp: 100 each, Rfl: 6  •  amitriptyline (ELAVIL) 10 MG tablet, , " Disp: , Rfl:   •  amLODIPine (NORVASC) 10 MG tablet, , Disp: , Rfl:   •  aspirin 81 MG EC tablet, Take 81 mg by mouth Daily., Disp: , Rfl:   •  atorvastatin (LIPITOR) 80 MG tablet, , Disp: , Rfl:   •  bisoprolol (ZEBeta) 5 MG tablet, , Disp: , Rfl:   •  Blood Glucose Monitoring Suppl (ACCU-CHEK JLUIS PLUS) w/Device kit, Use device to test blood sugar 3-4 times a day, Disp: 1 kit, Rfl: 0  •  BRILINTA 90 MG tablet tablet, , Disp: , Rfl:   •  Continuous Blood Gluc  (FREESTYLE CLAUDINE 14 DAY READER) device, 1 each Continuous., Disp: 1 Device, Rfl: 0  •  Continuous Blood Gluc Sensor (FREESTYLE CLAUDINE 14 DAY SENSOR) misc, 1 each Every 14 (Fourteen) Days., Disp: 6 each, Rfl: 3  •  ezetimibe (Zetia) 10 MG tablet, Take 1 tablet by mouth Daily., Disp: 90 tablet, Rfl: 1  •  furosemide (LASIX) 40 MG tablet, , Disp: , Rfl:   •  glucose blood (ACCU-CHEK JLUIS PLUS) test strip, Use as instructed to test blood sugar 3-4 times per day, Disp: 300 each, Rfl: 3  •  Insulin Glargine, 1 Unit Dial, (Toujeo SoloStar) 300 UNIT/ML solution pen-injector injection, Take 50u qhs, Disp: 10 pen, Rfl: 1  •  Insulin Lispro, 1 Unit Dial, (HumaLOG KwikPen) 100 UNIT/ML solution pen-injector, Take 25 units TID AC, Disp: 21 pen, Rfl: 1  •  Insulin Pen Needle 32G X 4 MM misc, Use QD, Disp: 150 each, Rfl: 3  •  Lancets 30G misc, Use as directed to test blood sugar 3-4 times a day, Disp: 100 each, Rfl: 5  •  levothyroxine (SYNTHROID, LEVOTHROID) 137 MCG tablet, Take 1 tablet by mouth Daily., Disp: 90 tablet, Rfl: 1  •  nitroglycerin (NITROSTAT) 0.4 MG SL tablet, , Disp: , Rfl:   •  pantoprazole (PROTONIX) 40 MG EC tablet, , Disp: , Rfl:   •  pioglitazone (ACTOS) 45 MG tablet, Take 1 tablet by mouth Daily., Disp: 90 tablet, Rfl: 1  •  potassium chloride (MICRO-K) 10 MEQ CR capsule, , Disp: , Rfl:   •  valsartan (DIOVAN) 320 MG tablet, , Disp: , Rfl:     Review of Systems  Review of Systems   Constitutional: Negative for chills, fatigue, fever and  unexpected weight change.   HENT: Positive for hearing loss (chronic). Negative for ear pain, nosebleeds, rhinorrhea and sore throat.    Eyes: Negative for pain, discharge, redness and itching.   Respiratory: Negative for cough, shortness of breath and wheezing.    Cardiovascular: Positive for leg swelling (chronic). Negative for chest pain and palpitations.   Gastrointestinal: Negative for abdominal pain, blood in stool, constipation and diarrhea.   Endocrine: Negative for cold intolerance, heat intolerance and polydipsia.   Genitourinary: Negative for dysuria, frequency, hematuria, pelvic pain, vaginal bleeding and vaginal discharge.   Musculoskeletal: Negative for arthralgias, gait problem, joint swelling and myalgias.   Skin: Negative for rash.   Allergic/Immunologic: Negative.    Neurological: Negative for dizziness, syncope, weakness, numbness and headaches.   Hematological: Negative for adenopathy.   Psychiatric/Behavioral: Negative for sleep disturbance and suicidal ideas. The patient is not nervous/anxious.         Physical Exam    /80   Pulse 60   Wt (!) 136 kg (300 lb 6.4 oz)   SpO2 97%   BMI 54.94 kg/m² Body mass index is 54.94 kg/m².  Physical Exam   Constitutional: She is oriented to person, place, and time. She appears well-developed. No distress.   HENT:   Head: Normocephalic.   Right Ear: External ear normal.   Left Ear: External ear normal.   Mouth/Throat: No oropharyngeal exudate.   Eyes: Conjunctivae and lids are normal. Right eye exhibits no discharge. Left eye exhibits no discharge. Right pupil is reactive. Left pupil is reactive.   Neck: No JVD present. No tracheal deviation present. No thyroid mass and no thyromegaly present.   Cardiovascular: Normal rate, regular rhythm and normal heart sounds.   No murmur heard.  Pulmonary/Chest: Effort normal and breath sounds normal. No respiratory distress. She has no wheezes.   Abdominal: Soft. Bowel sounds are normal. There is no abdominal  tenderness.   Musculoskeletal: No tenderness.   Lymphadenopathy:     She has no cervical adenopathy.   Neurological: She is alert and oriented to person, place, and time.   Skin: Skin is warm and dry. No rash noted. She is not diaphoretic. No erythema.   Psychiatric: Her speech is normal and behavior is normal. Thought content normal.       Labs and Imaging   Lab Results   Component Value Date    HGBA1C 6.5 11/10/2020    HGBA1C 7.7 08/11/2020    HGBA1C 7.9 03/16/2020     Office Visit on 11/10/2020   Component Date Value Ref Range Status   • Hemoglobin A1C 11/10/2020 6.5  % Final   • Glucose 11/10/2020 123  70 - 130 mg/dL Final     No images are attached to the encounter or orders placed in the encounter.  No Images in the past 120 days found..    Assessment / Plan   Diagnoses and all orders for this visit:    1. Uncontrolled type 2 diabetes mellitus with hypoglycemia without coma (CMS/[facility]) (Primary)  -     POC Glycosylated Hemoglobin (Hb A1C)  -     POC Glucose, Blood  -     Microalbumin / Creatinine Urine Ratio - Urine, Clean Catch  -     Insulin Glargine, 1 Unit Dial, (Toujeo SoloStar) 300 UNIT/ML solution pen-injector injection; Take 50u qhs  Dispense: 10 pen; Refill: 1  -     Insulin Lispro, 1 Unit Dial, (HumaLOG KwikPen) 100 UNIT/ML solution pen-injector; Take 25 units TID AC  Dispense: 21 pen; Refill: 1  -     pioglitazone (ACTOS) 45 MG tablet; Take 1 tablet by mouth Daily.  Dispense: 90 tablet; Refill: 1    2. Acquired hypothyroidism  -     levothyroxine (SYNTHROID, LEVOTHROID) 137 MCG tablet; Take 1 tablet by mouth Daily.  Dispense: 90 tablet; Refill: 1    3. Diabetic peripheral neuropathy associated with type 2 diabetes mellitus (CMS/HCC)        Diabetes Mellitus 2 is now under inadequate control  -with peripheral neuropathy, CAD, CKD3  -A1c 6.5, down from 7.7 8/2020  -humalog- continue 25 units TID AC. We discussed taking 5 units less for smaller meals. Her hypoglycemia often is a result of taking insulin  and then not having a chance to eat while at work  -continue toujeo 50U qhs.   -cont actos        1.  Diet: 3-4 carb servings per meal for females, 4-5 carb servings per meal for males  Spread carb intake throughout the day  Increase lean protein and vegetable intake  Avoid sugary drinks and processed carbs including crackers, cookies, cakes  2.  Exercise: Recommend at least 30 minutes of exercise daily, at least 5 days per week. Increase exercise gradually.   3.  Blood Glucose Goal: Blood glucose goal <150 fasting, <180 2 hr postprandial  4.  Microalbumin due 12/2020  5.  Education performed during this visit: long term diabetic complications discussed. , annual eye examinations at Ophthalmology discussed, dental hygiene discussed  and foot care reviewed., home glucose monitoring emphasized, all medications, side effects and compliance discussed carefully and Hypoglycemia management and prevention reviewed. Reviewed ‘ABCs’ of diabetes management (respective goals in parentheses):  A1C (<7), blood pressure (<130/80), and cholesterol (LDL <100, if CVD <70).    Hypothyroidism  -cont levothyroxine 137mcg daily.  -normal TSH 8/2020    Peripheral neuropathy  -controlled  -continue to monitor      There are no Patient Instructions on file for this visit.    Follow up: Return in about 3 months (around 2/10/2021).     Discussed the nature of the disease including, risks, complications, implications, management, safe and proper use of medications. Encouraged therapeutic lifestyle changes including low calorie diet with plenty of fruits and vegetables, daily exercise, medication compliance, and keeping scheduled follow up appointments with me and any other providers. Encouraged patient to have appointment for complete physical, fasting labs, appropriate screenings, and immunizations on an annual basis.    Awilda Conn PA-C

## 2020-11-11 LAB
ALBUMIN UR-MCNC: <1.2 MG/DL
CREAT UR-MCNC: 13.3 MG/DL
MICROALBUMIN/CREAT UR: NORMAL MG/G{CREAT}

## 2021-01-27 DIAGNOSIS — E11.649 UNCONTROLLED TYPE 2 DIABETES MELLITUS WITH HYPOGLYCEMIA WITHOUT COMA (HCC): ICD-10-CM

## 2021-01-27 RX ORDER — INSULIN GLARGINE 300 U/ML
INJECTION, SOLUTION SUBCUTANEOUS
Qty: 15 ML | Refills: 1 | Status: SHIPPED | OUTPATIENT
Start: 2021-01-27 | End: 2021-02-22 | Stop reason: SDUPTHER

## 2021-02-22 ENCOUNTER — LAB (OUTPATIENT)
Dept: LAB | Facility: HOSPITAL | Age: 61
End: 2021-02-22

## 2021-02-22 ENCOUNTER — OFFICE VISIT (OUTPATIENT)
Dept: ENDOCRINOLOGY | Facility: CLINIC | Age: 61
End: 2021-02-22

## 2021-02-22 VITALS
HEART RATE: 58 BPM | DIASTOLIC BLOOD PRESSURE: 80 MMHG | WEIGHT: 293 LBS | OXYGEN SATURATION: 98 % | BODY MASS INDEX: 54.4 KG/M2 | SYSTOLIC BLOOD PRESSURE: 124 MMHG

## 2021-02-22 DIAGNOSIS — E03.9 ACQUIRED HYPOTHYROIDISM: Chronic | ICD-10-CM

## 2021-02-22 DIAGNOSIS — E78.2 MIXED HYPERLIPIDEMIA: Chronic | ICD-10-CM

## 2021-02-22 DIAGNOSIS — E11.40 CONTROLLED TYPE 2 DIABETES WITH NEUROPATHY (HCC): Primary | Chronic | ICD-10-CM

## 2021-02-22 DIAGNOSIS — E11.42 DIABETIC PERIPHERAL NEUROPATHY ASSOCIATED WITH TYPE 2 DIABETES MELLITUS (HCC): Chronic | ICD-10-CM

## 2021-02-22 LAB
ALBUMIN SERPL-MCNC: 4 G/DL (ref 3.5–5.2)
ALBUMIN/GLOB SERPL: 1.1 G/DL
ALP SERPL-CCNC: 118 U/L (ref 39–117)
ALT SERPL W P-5'-P-CCNC: 9 U/L (ref 1–33)
ANION GAP SERPL CALCULATED.3IONS-SCNC: 11.4 MMOL/L (ref 5–15)
AST SERPL-CCNC: 17 U/L (ref 1–32)
BILIRUB SERPL-MCNC: 0.5 MG/DL (ref 0–1.2)
BUN SERPL-MCNC: 18 MG/DL (ref 6–20)
BUN/CREAT SERPL: 12.6 (ref 7–25)
CALCIUM SPEC-SCNC: 9.9 MG/DL (ref 8.6–10.5)
CHLORIDE SERPL-SCNC: 104 MMOL/L (ref 98–107)
CHOLEST SERPL-MCNC: 102 MG/DL (ref 0–200)
CO2 SERPL-SCNC: 27.6 MMOL/L (ref 22–29)
CREAT SERPL-MCNC: 1.43 MG/DL (ref 0.57–1)
GFR SERPL CREATININE-BSD FRML MDRD: 38 ML/MIN/1.73
GLOBULIN UR ELPH-MCNC: 3.7 GM/DL
GLUCOSE BLDC GLUCOMTR-MCNC: 99 MG/DL (ref 70–130)
GLUCOSE SERPL-MCNC: 86 MG/DL (ref 65–99)
HBA1C MFR BLD: 6.1 %
HDLC SERPL-MCNC: 40 MG/DL (ref 40–60)
LDLC SERPL CALC-MCNC: 48 MG/DL (ref 0–100)
LDLC/HDLC SERPL: 1.24 {RATIO}
POTASSIUM SERPL-SCNC: 4.1 MMOL/L (ref 3.5–5.2)
PROT SERPL-MCNC: 7.7 G/DL (ref 6–8.5)
SODIUM SERPL-SCNC: 143 MMOL/L (ref 136–145)
T4 FREE SERPL-MCNC: 2.28 NG/DL (ref 0.93–1.7)
TRIGL SERPL-MCNC: 63 MG/DL (ref 0–150)
TSH SERPL DL<=0.05 MIU/L-ACNC: 0.29 UIU/ML (ref 0.27–4.2)
VLDLC SERPL-MCNC: 14 MG/DL (ref 5–40)

## 2021-02-22 PROCEDURE — 83036 HEMOGLOBIN GLYCOSYLATED A1C: CPT | Performed by: PHYSICIAN ASSISTANT

## 2021-02-22 PROCEDURE — 82043 UR ALBUMIN QUANTITATIVE: CPT | Performed by: PHYSICIAN ASSISTANT

## 2021-02-22 PROCEDURE — 80061 LIPID PANEL: CPT | Performed by: PHYSICIAN ASSISTANT

## 2021-02-22 PROCEDURE — 84439 ASSAY OF FREE THYROXINE: CPT | Performed by: PHYSICIAN ASSISTANT

## 2021-02-22 PROCEDURE — 84443 ASSAY THYROID STIM HORMONE: CPT | Performed by: PHYSICIAN ASSISTANT

## 2021-02-22 PROCEDURE — 82570 ASSAY OF URINE CREATININE: CPT | Performed by: PHYSICIAN ASSISTANT

## 2021-02-22 PROCEDURE — 99214 OFFICE O/P EST MOD 30 MIN: CPT | Performed by: PHYSICIAN ASSISTANT

## 2021-02-22 PROCEDURE — 82947 ASSAY GLUCOSE BLOOD QUANT: CPT | Performed by: PHYSICIAN ASSISTANT

## 2021-02-22 PROCEDURE — 80053 COMPREHEN METABOLIC PANEL: CPT | Performed by: PHYSICIAN ASSISTANT

## 2021-02-22 RX ORDER — INSULIN GLARGINE 300 U/ML
INJECTION, SOLUTION SUBCUTANEOUS
Qty: 18 ML | Refills: 1 | Status: SHIPPED | OUTPATIENT
Start: 2021-02-22 | End: 2021-09-18 | Stop reason: SDUPTHER

## 2021-02-22 RX ORDER — PIOGLITAZONEHYDROCHLORIDE 45 MG/1
45 TABLET ORAL DAILY
Qty: 90 TABLET | Refills: 1 | Status: SHIPPED | OUTPATIENT
Start: 2021-02-22 | End: 2021-09-18 | Stop reason: SDUPTHER

## 2021-02-22 RX ORDER — INSULIN LISPRO 100 [IU]/ML
INJECTION, SOLUTION INTRAVENOUS; SUBCUTANEOUS
Qty: 63 ML | Refills: 1 | Status: SHIPPED | OUTPATIENT
Start: 2021-02-22 | End: 2021-09-18 | Stop reason: SDUPTHER

## 2021-02-22 RX ORDER — EZETIMIBE 10 MG/1
10 TABLET ORAL DAILY
Qty: 90 TABLET | Refills: 1 | Status: SHIPPED | OUTPATIENT
Start: 2021-02-22 | End: 2021-09-18 | Stop reason: SDUPTHER

## 2021-02-22 NOTE — PROGRESS NOTES
"Chief Complaint  F/u for Diabetes Mellitus and Hypothyroidism.    HPI   Emily Valdez is a 60 y.o. female with CKD 3 who is here today for f/u of Diabetes Mellitus type 2 and hypothyroidism. The initial diagnosis of diabetes was made at age 34.    A1C- 6.5 (11/10/2020), 7.7 (8/11/2020), 7.9 (3/16/2020), 8.3 (12/11/19), 7.6 (9/4/19), 7.5 (6/3/19), 7.2 (2/27/19), 12 (10/11/18), 7.3 (9/18/17)    Diabetic complications: cardiovascular disease s/p multiple MIs followed by Dr Gagnon. She has tingling in both feet x 20 years.   Eye exam current (within one year): utd- 12/5/2020. 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 Dr. Francisco marie, no retinopathy  Foot care and dental care: discussed    Current diabetic medications include:  Actos 45mg qd - tolerating well  Toujeo 60U QHS   Humalog 10-20 units before breakfast, 25 units before lunch and dinner  Statin: lipitor 80    Past medications: Byetta (n/v), metformin (dc d/t decreased renal fxn), trajenta, glyburide    Diabetic Monitoring  -   Checking 3-4x/day  fbs 100-130, before meals mostly 120-160 with occasional hyperglycemia in the 200s. No hypoglycemia    Did not like Freestyle Janusz- thought it wasn't accurate     Nutrition:     Current diet: in general, an \"unhealthy\" diet- has improved diet since last visit. Eat low carb.   Current exercise: none  Seen RD in past: no    Hypothyroidism- dx in her late 20s, on levothyroxine 137mcg daily. No recent dose increase.     The following portions of the patient's history were reviewed and updated by me as appropriate: allergies, current medications, past family history, past social history, past surgical history and problem list.        History reviewed. No pertinent past medical history.    Medications    Current Outpatient Medications:   •  Alcohol Swabs 70 % pads, Use qd, Disp: 100 each, Rfl: 6  •  amitriptyline (ELAVIL) 10 MG tablet, , Disp: , Rfl:   •  amLODIPine (NORVASC) 10 MG tablet, , Disp: , Rfl:   •  aspirin 81 MG EC tablet, " Take 81 mg by mouth Daily., Disp: , Rfl:   •  atorvastatin (LIPITOR) 80 MG tablet, , Disp: , Rfl:   •  bisoprolol (ZEBeta) 5 MG tablet, , Disp: , Rfl:   •  Blood Glucose Monitoring Suppl (ACCU-CHEK JLUIS PLUS) w/Device kit, Use device to test blood sugar 3-4 times a day, Disp: 1 kit, Rfl: 0  •  BRILINTA 90 MG tablet tablet, , Disp: , Rfl:   •  ezetimibe (Zetia) 10 MG tablet, Take 1 tablet by mouth Daily., Disp: 90 tablet, Rfl: 1  •  furosemide (LASIX) 40 MG tablet, , Disp: , Rfl:   •  glucose blood (ACCU-CHEK JLUIS PLUS) test strip, Use as instructed to test blood sugar 3-4 times per day, Disp: 300 each, Rfl: 3  •  Insulin Glargine, 1 Unit Dial, (Toujeo SoloStar) 300 UNIT/ML solution pen-injector injection, Take 60u qhs, Disp: 18 mL, Rfl: 1  •  Insulin Lispro, 1 Unit Dial, (HumaLOG KwikPen) 100 UNIT/ML solution pen-injector, Take up to 25 units TID AC, Disp: 63 mL, Rfl: 1  •  Insulin Pen Needle 32G X 4 MM misc, Use QID, Disp: 400 each, Rfl: 3  •  Lancets 30G misc, Use as directed to test blood sugar 3-4 times a day, Disp: 100 each, Rfl: 5  •  levothyroxine (SYNTHROID, LEVOTHROID) 137 MCG tablet, Take 1 tablet by mouth Daily., Disp: 90 tablet, Rfl: 1  •  nitroglycerin (NITROSTAT) 0.4 MG SL tablet, , Disp: , Rfl:   •  pantoprazole (PROTONIX) 40 MG EC tablet, , Disp: , Rfl:   •  pioglitazone (ACTOS) 45 MG tablet, Take 1 tablet by mouth Daily., Disp: 90 tablet, Rfl: 1  •  potassium chloride (MICRO-K) 10 MEQ CR capsule, , Disp: , Rfl:   •  valsartan (DIOVAN) 320 MG tablet, , Disp: , Rfl:     Review of Systems  Review of Systems   Constitutional: Negative for chills, fatigue, fever and unexpected weight change.   HENT: Positive for hearing loss (chronic). Negative for ear pain, nosebleeds, rhinorrhea and sore throat.    Eyes: Negative for pain, discharge, redness and itching.   Respiratory: Negative for cough, shortness of breath and wheezing.    Cardiovascular: Positive for leg swelling (chronic). Negative for chest pain  and palpitations.   Gastrointestinal: Negative for abdominal pain, blood in stool, constipation and diarrhea.   Endocrine: Negative for cold intolerance, heat intolerance and polydipsia.   Genitourinary: Negative for dysuria, frequency, hematuria, pelvic pain, vaginal bleeding and vaginal discharge.   Musculoskeletal: Negative for arthralgias, gait problem, joint swelling and myalgias.   Skin: Negative for rash.   Allergic/Immunologic: Negative.    Neurological: Negative for dizziness, syncope, weakness, numbness and headaches.   Hematological: Negative for adenopathy.   Psychiatric/Behavioral: Negative for sleep disturbance and suicidal ideas. The patient is not nervous/anxious.         Physical Exam    /80   Pulse 58   Wt 135 kg (297 lb 6.4 oz)   SpO2 98%   BMI 54.40 kg/m² Body mass index is 54.4 kg/m².  Physical Exam   Constitutional: She is oriented to person, place, and time. She appears well-developed. No distress.   HENT:   Head: Normocephalic.   Right Ear: External ear normal.   Left Ear: External ear normal.   Mouth/Throat: No oropharyngeal exudate.   Eyes: Conjunctivae and lids are normal. Right eye exhibits no discharge. Left eye exhibits no discharge. Right pupil is reactive. Left pupil is reactive.   Neck: No JVD present. No tracheal deviation present. No thyroid mass and no thyromegaly present.   Cardiovascular: Normal rate, regular rhythm and normal heart sounds.   No murmur heard.  Pulmonary/Chest: Effort normal and breath sounds normal. No respiratory distress. She has no wheezes.   Abdominal: Soft. Bowel sounds are normal. There is no abdominal tenderness.   Musculoskeletal: No tenderness.    Emily had a diabetic foot exam performed today.   During the foot exam she had a monofilament test performed.    Neurological Sensory Findings - Unaltered hot/cold right ankle/foot discrimination and unaltered hot/cold left ankle/foot discrimination. Unaltered sharp/dull right ankle/foot  discrimination and unaltered sharp/dull left ankle/foot discrimination. No right ankle/foot altered proprioception and no left ankle/foot altered proprioception  Vascular Status -  Her right foot exhibits normal foot vasculature  and no edema. Her left foot exhibits normal foot vasculature  and no edema.  Skin Integrity  -  Her right foot skin is intact.  She has no right foot onychomycosis, no right foot ulcer and non-callous right foot.Her left foot skin is intact. She has no left foot onychomycosis, no left foot ulcer and non-callous left foot..  Lymphadenopathy:     She has no cervical adenopathy.   Neurological: She is alert and oriented to person, place, and time.   Skin: Skin is warm and dry. No rash noted. She is not diaphoretic. No erythema.   Psychiatric: Her speech is normal and behavior is normal. Thought content normal.       Labs and Imaging   Lab Results   Component Value Date    HGBA1C 6.1 02/22/2021    HGBA1C 6.5 11/10/2020    HGBA1C 7.7 08/11/2020     Office Visit on 02/22/2021   Component Date Value Ref Range Status   • Glucose 02/22/2021 99  70 - 130 mg/dL Final   • Hemoglobin A1C 02/22/2021 6.1  % Final     No images are attached to the encounter or orders placed in the encounter.  No Images in the past 120 days found..    Assessment / Plan   Diagnoses and all orders for this visit:    1. Controlled type 2 diabetes with neuropathy (CMS/HCC) (Primary)  -     Microalbumin / Creatinine Urine Ratio - Urine, Clean Catch  -     Comprehensive Metabolic Panel  -     Lipid Panel  -     POC Glucose, Blood  -     POC Glycosylated Hemoglobin (Hb A1C)  -     pioglitazone (ACTOS) 45 MG tablet; Take 1 tablet by mouth Daily.  Dispense: 90 tablet; Refill: 1  -     Insulin Glargine, 1 Unit Dial, (Toujeo SoloStar) 300 UNIT/ML solution pen-injector injection; Take 60u qhs  Dispense: 18 mL; Refill: 1  -     Insulin Lispro, 1 Unit Dial, (HumaLOG KwikPen) 100 UNIT/ML solution pen-injector; Take up to 25 units TID AC   Dispense: 63 mL; Refill: 1  -     Insulin Pen Needle 32G X 4 MM misc; Use QID  Dispense: 400 each; Refill: 3    2. Diabetic peripheral neuropathy associated with type 2 diabetes mellitus (CMS/HCC)    3. Acquired hypothyroidism  -     TSH  -     T4, Free    4. Mixed hyperlipidemia  -     ezetimibe (Zetia) 10 MG tablet; Take 1 tablet by mouth Daily.  Dispense: 90 tablet; Refill: 1        Diabetes Mellitus 2 is now under inadequate control  -with peripheral neuropathy, CAD, CKD3  -A1c 6.1 without hypoglycemia  -humalog- continue 10-20 meals before breakfast, 25 units before lunch and supper.   -continue toujeo 60U qhs.   -cont actos        1.  Diet: 3-4 carb servings per meal for females, 4-5 carb servings per meal for males  Spread carb intake throughout the day  Increase lean protein and vegetable intake  Avoid sugary drinks and processed carbs including crackers, cookies, cakes  2.  Exercise: Recommend at least 30 minutes of exercise daily, at least 5 days per week. Increase exercise gradually.   3.  Blood Glucose Goal: Blood glucose goal <150 fasting, <180 2 hr postprandial  4.  Microalbumin due 11/2021  5.  Education performed during this visit: long term diabetic complications discussed. , annual eye examinations at Ophthalmology discussed, dental hygiene discussed  and foot care reviewed., home glucose monitoring emphasized, all medications, side effects and compliance discussed carefully and Hypoglycemia management and prevention reviewed. Reviewed ‘ABCs’ of diabetes management (respective goals in parentheses):  A1C (<7), blood pressure (<130/80), and cholesterol (LDL <100, if CVD <70).    Hypothyroidism  -cont levothyroxine 137mcg daily.  -repeat TFTs    Peripheral neuropathy  -controlled  -continue to monitor      There are no Patient Instructions on file for this visit.    Follow up: Return in about 3 months (around 5/22/2021).     Discussed the nature of the disease including, risks, complications,  implications, management, safe and proper use of medications. Encouraged therapeutic lifestyle changes including low calorie diet with plenty of fruits and vegetables, daily exercise, medication compliance, and keeping scheduled follow up appointments with me and any other providers. Encouraged patient to have appointment for complete physical, fasting labs, appropriate screenings, and immunizations on an annual basis.    Awilda Conn PA-C

## 2021-02-23 DIAGNOSIS — E03.9 ACQUIRED HYPOTHYROIDISM: ICD-10-CM

## 2021-02-23 LAB
ALBUMIN UR-MCNC: <1.2 MG/DL
CREAT UR-MCNC: 33.3 MG/DL
MICROALBUMIN/CREAT UR: NORMAL MG/G{CREAT}

## 2021-02-23 RX ORDER — LEVOTHYROXINE SODIUM 0.12 MG/1
125 TABLET ORAL DAILY
Qty: 90 TABLET | Refills: 1 | Status: SHIPPED | OUTPATIENT
Start: 2021-02-23 | End: 2021-09-23 | Stop reason: SDUPTHER

## 2021-06-22 ENCOUNTER — LAB (OUTPATIENT)
Dept: LAB | Facility: HOSPITAL | Age: 61
End: 2021-06-22

## 2021-06-22 ENCOUNTER — OFFICE VISIT (OUTPATIENT)
Dept: ENDOCRINOLOGY | Facility: CLINIC | Age: 61
End: 2021-06-22

## 2021-06-22 VITALS
SYSTOLIC BLOOD PRESSURE: 124 MMHG | HEART RATE: 61 BPM | BODY MASS INDEX: 53.92 KG/M2 | HEIGHT: 62 IN | WEIGHT: 293 LBS | DIASTOLIC BLOOD PRESSURE: 80 MMHG | OXYGEN SATURATION: 95 %

## 2021-06-22 DIAGNOSIS — E11.42 DIABETIC PERIPHERAL NEUROPATHY ASSOCIATED WITH TYPE 2 DIABETES MELLITUS (HCC): Chronic | ICD-10-CM

## 2021-06-22 DIAGNOSIS — E11.40 CONTROLLED TYPE 2 DIABETES WITH NEUROPATHY (HCC): Primary | Chronic | ICD-10-CM

## 2021-06-22 DIAGNOSIS — E03.9 ACQUIRED HYPOTHYROIDISM: Chronic | ICD-10-CM

## 2021-06-22 LAB
GLUCOSE BLDC GLUCOMTR-MCNC: 100 MG/DL (ref 70–130)
HBA1C MFR BLD: 6.2 %

## 2021-06-22 PROCEDURE — 84439 ASSAY OF FREE THYROXINE: CPT | Performed by: PHYSICIAN ASSISTANT

## 2021-06-22 PROCEDURE — 80061 LIPID PANEL: CPT | Performed by: PHYSICIAN ASSISTANT

## 2021-06-22 PROCEDURE — 83036 HEMOGLOBIN GLYCOSYLATED A1C: CPT | Performed by: PHYSICIAN ASSISTANT

## 2021-06-22 PROCEDURE — 84443 ASSAY THYROID STIM HORMONE: CPT | Performed by: PHYSICIAN ASSISTANT

## 2021-06-22 PROCEDURE — 99214 OFFICE O/P EST MOD 30 MIN: CPT | Performed by: PHYSICIAN ASSISTANT

## 2021-06-22 PROCEDURE — 80053 COMPREHEN METABOLIC PANEL: CPT | Performed by: PHYSICIAN ASSISTANT

## 2021-06-22 PROCEDURE — 82947 ASSAY GLUCOSE BLOOD QUANT: CPT | Performed by: PHYSICIAN ASSISTANT

## 2021-06-22 NOTE — PROGRESS NOTES
"Chief Complaint  F/u for Diabetes Mellitus and Hypothyroidism.    HPI   Emily Valdez is a 61 y.o. female with CKD 3 who is here today for f/u of Diabetes Mellitus type 2 and hypothyroidism. The initial diagnosis of diabetes was made at age 34.    Eye exam Dr. Francisco Hurtado    A1C- 6.5 (11/10/2020), 7.7 (8/11/2020), 7.9 (3/16/2020), 8.3 (12/11/19), 7.6 (9/4/19), 7.5 (6/3/19), 7.2 (2/27/19), 12 (10/11/18), 7.3 (9/18/17)    Diabetic complications: cardiovascular disease s/p multiple MIs followed by Dr Gagnon. She has tingling in both feet x 20 years.   Eye exam current (within one year): utd- 12/5/2020. 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 Dr. Francisco hurtado q 4 months, no retinopathy  Foot care and dental care: discussed    Current diabetic medications include:  Actos 45mg qd - tolerating well  Toujeo 65U QHS   Humalog 10-20 units before breakfast, 25 units before lunch and dinner  Statin: lipitor 80    Past medications: Byetta (n/v), metformin (dc d/t decreased renal fxn), trajenta, glyburide    Diabetic Monitoring  -   Checking 3-4x/day  Fasting blood sugar ranging from 80s to 120s mostly, before lunch mostly 90s to 150s, before supper mostly 1 20-1 50, no hypoglycemia    Did not like Freestyle Janusz- thought it wasn't accurate     Nutrition:     Current diet: in general, an \"unhealthy\" diet- has improved diet since last visit. Eat low carb.   Current exercise: none  Seen RD in past: no    Hypothyroidism- dx in her late 20s, on levothyroxine 137mcg daily. No recent dose increase.     The following portions of the patient's history were reviewed and updated by me as appropriate: allergies, current medications, past family history, past social history, past surgical history and problem list.        History reviewed. No pertinent past medical history.    Medications    Current Outpatient Medications:   •  amitriptyline (ELAVIL) 10 MG tablet, , Disp: , Rfl:   •  amLODIPine (NORVASC) 10 MG tablet, , Disp: , Rfl:   •  aspirin 81 " MG EC tablet, Take 81 mg by mouth Daily., Disp: , Rfl:   •  atorvastatin (LIPITOR) 80 MG tablet, , Disp: , Rfl:   •  bisoprolol (ZEBeta) 5 MG tablet, , Disp: , Rfl:   •  BRILINTA 90 MG tablet tablet, , Disp: , Rfl:   •  ezetimibe (Zetia) 10 MG tablet, Take 1 tablet by mouth Daily., Disp: 90 tablet, Rfl: 1  •  furosemide (LASIX) 40 MG tablet, , Disp: , Rfl:   •  Insulin Glargine, 1 Unit Dial, (Toujeo SoloStar) 300 UNIT/ML solution pen-injector injection, Take 60u qhs, Disp: 18 mL, Rfl: 1  •  Insulin Lispro, 1 Unit Dial, (HumaLOG KwikPen) 100 UNIT/ML solution pen-injector, Take up to 25 units TID AC, Disp: 63 mL, Rfl: 1  •  Insulin Pen Needle 32G X 4 MM misc, Use QID, Disp: 400 each, Rfl: 3  •  Lancets 30G misc, Use as directed to test blood sugar 3-4 times a day, Disp: 100 each, Rfl: 5  •  levothyroxine (SYNTHROID, LEVOTHROID) 125 MCG tablet, Take 1 tablet by mouth Daily., Disp: 90 tablet, Rfl: 1  •  nitroglycerin (NITROSTAT) 0.4 MG SL tablet, , Disp: , Rfl:   •  pantoprazole (PROTONIX) 40 MG EC tablet, , Disp: , Rfl:   •  pioglitazone (ACTOS) 45 MG tablet, Take 1 tablet by mouth Daily., Disp: 90 tablet, Rfl: 1  •  potassium chloride (MICRO-K) 10 MEQ CR capsule, , Disp: , Rfl:   •  valsartan (DIOVAN) 320 MG tablet, , Disp: , Rfl:     Review of Systems  Review of Systems   Constitutional: Negative for chills, fatigue, fever and unexpected weight change.   HENT: Positive for hearing loss (chronic). Negative for ear pain, nosebleeds, rhinorrhea and sore throat.    Eyes: Negative for pain, discharge, redness and itching.   Respiratory: Negative for cough, shortness of breath and wheezing.    Cardiovascular: Positive for leg swelling (chronic). Negative for chest pain and palpitations.   Gastrointestinal: Negative for abdominal pain, blood in stool, constipation and diarrhea.   Endocrine: Negative for cold intolerance, heat intolerance and polydipsia.   Genitourinary: Negative for dysuria, frequency, hematuria, pelvic  "pain, vaginal bleeding and vaginal discharge.   Musculoskeletal: Negative for arthralgias, gait problem, joint swelling and myalgias.   Skin: Negative for rash.   Allergic/Immunologic: Negative.    Neurological: Negative for dizziness, syncope, weakness, numbness and headaches.   Hematological: Negative for adenopathy.   Psychiatric/Behavioral: Negative for sleep disturbance and suicidal ideas. The patient is not nervous/anxious.         Physical Exam    /80   Pulse 61   Ht 157.5 cm (62\")   Wt (!) 139 kg (306 lb)   SpO2 95%   BMI 55.97 kg/m² Body mass index is 55.97 kg/m².  Physical Exam   Constitutional: She is oriented to person, place, and time. She appears well-developed. No distress.   HENT:   Head: Normocephalic.   Right Ear: External ear normal.   Left Ear: External ear normal.   Mouth/Throat: No oropharyngeal exudate.   Eyes: Conjunctivae and lids are normal. Right eye exhibits no discharge. Left eye exhibits no discharge. Right pupil is reactive. Left pupil is reactive.   Neck: No JVD present. No tracheal deviation present. No thyroid mass and no thyromegaly present.   Cardiovascular: Normal rate, regular rhythm and normal heart sounds.   No murmur heard.  Pulmonary/Chest: Effort normal and breath sounds normal. No respiratory distress. She has no wheezes.   Abdominal: Soft. Bowel sounds are normal. There is no abdominal tenderness.   Musculoskeletal: No tenderness.   Lymphadenopathy:     She has no cervical adenopathy.   Neurological: She is alert and oriented to person, place, and time.   Skin: Skin is warm and dry. No rash noted. She is not diaphoretic. No erythema.   Psychiatric: Her speech is normal and behavior is normal. Thought content normal.       Labs and Imaging   Lab Results   Component Value Date    HGBA1C 6.2 06/22/2021    HGBA1C 6.1 02/22/2021    HGBA1C 6.5 11/10/2020     Office Visit on 06/22/2021   Component Date Value Ref Range Status   • Glucose 06/22/2021 100  70 - 130 mg/dL " Final   • Hemoglobin A1C 06/22/2021 6.2  % Final     No images are attached to the encounter or orders placed in the encounter.  No Images in the past 120 days found..    Assessment / Plan   Diagnoses and all orders for this visit:    1. Controlled type 2 diabetes with neuropathy (CMS/[facility]) (Primary)  -     POC Glucose, Blood  -     POC Glycosylated Hemoglobin (Hb A1C)  -     Comprehensive Metabolic Panel  -     Lipid Panel  -     TSH  -     T4, Free    2. Diabetic peripheral neuropathy associated with type 2 diabetes mellitus (CMS/[facility])    3. Acquired hypothyroidism  -     TSH  -     T4, Free        Diabetes Mellitus 2 is now under good control  -with peripheral neuropathy, CAD, CKD3  -A1c 6.2  -Checking 3-4x/day  Fasting blood sugar ranging from 80s to 120s mostly, before lunch mostly 90s to 150s, before supper mostly 1 20-1 50, no hypoglycemia  -she reports occasional fasting near hypoglycemia  -humalog- continue 10-20 meals before breakfast, 25 units before lunch and supper.   -decrease toujeo to 60U qhs.   -cont actos  -foot exam updated 2/2021, labs today, microalb due 2/2022, eye exam q 4 months with dr miguelito hurtado        1.  Diet: 3-4 carb servings per meal for females, 4-5 carb servings per meal for males  Spread carb intake throughout the day  Increase lean protein and vegetable intake  Avoid sugary drinks and processed carbs including crackers, cookies, cakes  2.  Exercise: Recommend at least 30 minutes of exercise daily, at least 5 days per week. Increase exercise gradually.   3.  Blood Glucose Goal: Blood glucose goal <150 fasting, <180 2 hr postprandial  4.  Microalbumin due 2/2022  5.  Education performed during this visit: long term diabetic complications discussed. , annual eye examinations at Ophthalmology discussed, dental hygiene discussed  and foot care reviewed., home glucose monitoring emphasized, all medications, side effects and compliance discussed carefully and Hypoglycemia management and  prevention reviewed. Reviewed ‘ABCs’ of diabetes management (respective goals in parentheses):  A1C (<7), blood pressure (<130/80), and cholesterol (LDL <100, if CVD <70).    Hypothyroidism  -cont levothyroxine 137mcg daily.  -repeat TFTs today    Peripheral neuropathy  -controlled  -continue to monitor      There are no Patient Instructions on file for this visit.    Follow up: Return in about 6 months (around 12/22/2021).     Discussed the nature of the disease including, risks, complications, implications, management, safe and proper use of medications. Encouraged therapeutic lifestyle changes including low calorie diet with plenty of fruits and vegetables, daily exercise, medication compliance, and keeping scheduled follow up appointments with me and any other providers. Encouraged patient to have appointment for complete physical, fasting labs, appropriate screenings, and immunizations on an annual basis.    Awilda Conn PA-C

## 2021-06-23 LAB
ALBUMIN SERPL-MCNC: 3.9 G/DL (ref 3.5–5.2)
ALBUMIN/GLOB SERPL: 1.1 G/DL
ALP SERPL-CCNC: 114 U/L (ref 39–117)
ALT SERPL W P-5'-P-CCNC: 9 U/L (ref 1–33)
ANION GAP SERPL CALCULATED.3IONS-SCNC: 9.4 MMOL/L (ref 5–15)
AST SERPL-CCNC: 12 U/L (ref 1–32)
BILIRUB SERPL-MCNC: 0.5 MG/DL (ref 0–1.2)
BUN SERPL-MCNC: 18 MG/DL (ref 6–20)
BUN/CREAT SERPL: 11.6 (ref 7–25)
CALCIUM SPEC-SCNC: 9.8 MG/DL (ref 8.6–10.5)
CHLORIDE SERPL-SCNC: 104 MMOL/L (ref 98–107)
CHOLEST SERPL-MCNC: 101 MG/DL (ref 0–200)
CO2 SERPL-SCNC: 28.6 MMOL/L (ref 22–29)
CREAT SERPL-MCNC: 1.55 MG/DL (ref 0.57–1)
GFR SERPL CREATININE-BSD FRML MDRD: 34 ML/MIN/1.73
GLOBULIN UR ELPH-MCNC: 3.6 GM/DL
GLUCOSE SERPL-MCNC: 76 MG/DL (ref 65–99)
HDLC SERPL-MCNC: 38 MG/DL (ref 40–60)
LDLC SERPL CALC-MCNC: 52 MG/DL (ref 0–100)
LDLC/HDLC SERPL: 1.43 {RATIO}
POTASSIUM SERPL-SCNC: 4 MMOL/L (ref 3.5–5.2)
PROT SERPL-MCNC: 7.5 G/DL (ref 6–8.5)
SODIUM SERPL-SCNC: 142 MMOL/L (ref 136–145)
T4 FREE SERPL-MCNC: 1.73 NG/DL (ref 0.93–1.7)
TRIGL SERPL-MCNC: 44 MG/DL (ref 0–150)
TSH SERPL DL<=0.05 MIU/L-ACNC: 1.48 UIU/ML (ref 0.27–4.2)
VLDLC SERPL-MCNC: 11 MG/DL (ref 5–40)

## 2021-09-18 DIAGNOSIS — E11.40 CONTROLLED TYPE 2 DIABETES WITH NEUROPATHY (HCC): Chronic | ICD-10-CM

## 2021-09-18 DIAGNOSIS — E78.2 MIXED HYPERLIPIDEMIA: Chronic | ICD-10-CM

## 2021-09-20 RX ORDER — INSULIN LISPRO 100 [IU]/ML
INJECTION, SOLUTION INTRAVENOUS; SUBCUTANEOUS
Qty: 45 ML | Refills: 1 | Status: SHIPPED | OUTPATIENT
Start: 2021-09-20 | End: 2022-01-03 | Stop reason: SDUPTHER

## 2021-09-20 RX ORDER — EZETIMIBE 10 MG/1
10 TABLET ORAL DAILY
Qty: 90 TABLET | Refills: 1 | Status: SHIPPED | OUTPATIENT
Start: 2021-09-20 | End: 2022-01-03 | Stop reason: SDUPTHER

## 2021-09-20 RX ORDER — INSULIN GLARGINE 300 U/ML
INJECTION, SOLUTION SUBCUTANEOUS
Qty: 54 ML | Refills: 1 | Status: SHIPPED | OUTPATIENT
Start: 2021-09-20 | End: 2022-01-03 | Stop reason: SDUPTHER

## 2021-09-20 RX ORDER — PIOGLITAZONEHYDROCHLORIDE 45 MG/1
45 TABLET ORAL DAILY
Qty: 90 TABLET | Refills: 1 | Status: SHIPPED | OUTPATIENT
Start: 2021-09-20 | End: 2022-01-03 | Stop reason: SDUPTHER

## 2021-09-23 DIAGNOSIS — E03.9 ACQUIRED HYPOTHYROIDISM: ICD-10-CM

## 2021-09-23 RX ORDER — LEVOTHYROXINE SODIUM 0.12 MG/1
125 TABLET ORAL DAILY
Qty: 90 TABLET | Refills: 1 | Status: SHIPPED | OUTPATIENT
Start: 2021-09-23 | End: 2022-01-03 | Stop reason: SDUPTHER

## 2022-01-03 ENCOUNTER — OFFICE VISIT (OUTPATIENT)
Dept: ENDOCRINOLOGY | Facility: CLINIC | Age: 62
End: 2022-01-03

## 2022-01-03 ENCOUNTER — LAB (OUTPATIENT)
Dept: LAB | Facility: HOSPITAL | Age: 62
End: 2022-01-03

## 2022-01-03 VITALS
OXYGEN SATURATION: 99 % | HEIGHT: 62 IN | WEIGHT: 293 LBS | SYSTOLIC BLOOD PRESSURE: 132 MMHG | BODY MASS INDEX: 53.92 KG/M2 | DIASTOLIC BLOOD PRESSURE: 64 MMHG | HEART RATE: 67 BPM

## 2022-01-03 DIAGNOSIS — E11.40 CONTROLLED TYPE 2 DIABETES WITH NEUROPATHY: Primary | Chronic | ICD-10-CM

## 2022-01-03 DIAGNOSIS — E78.2 MIXED HYPERLIPIDEMIA: Chronic | ICD-10-CM

## 2022-01-03 DIAGNOSIS — E03.9 ACQUIRED HYPOTHYROIDISM: Chronic | ICD-10-CM

## 2022-01-03 LAB
ALBUMIN SERPL-MCNC: 4.2 G/DL (ref 3.5–5.2)
ALBUMIN/GLOB SERPL: 1.3 G/DL
ALP SERPL-CCNC: 124 U/L (ref 39–117)
ALT SERPL W P-5'-P-CCNC: 7 U/L (ref 1–33)
ANION GAP SERPL CALCULATED.3IONS-SCNC: 9.2 MMOL/L (ref 5–15)
AST SERPL-CCNC: 17 U/L (ref 1–32)
BILIRUB SERPL-MCNC: 0.5 MG/DL (ref 0–1.2)
BUN SERPL-MCNC: 21 MG/DL (ref 6–20)
BUN/CREAT SERPL: 13.9 (ref 7–25)
CALCIUM SPEC-SCNC: 9.7 MG/DL (ref 8.6–10.5)
CHLORIDE SERPL-SCNC: 106 MMOL/L (ref 98–107)
CHOLEST SERPL-MCNC: 98 MG/DL (ref 0–200)
CO2 SERPL-SCNC: 26.8 MMOL/L (ref 22–29)
CREAT SERPL-MCNC: 1.51 MG/DL (ref 0.57–1)
EXPIRATION DATE: NORMAL
GFR SERPL CREATININE-BSD FRML MDRD: 35 ML/MIN/1.73
GLOBULIN UR ELPH-MCNC: 3.3 GM/DL
GLUCOSE BLDC GLUCOMTR-MCNC: 120 MG/DL (ref 70–130)
GLUCOSE SERPL-MCNC: 98 MG/DL (ref 65–99)
HBA1C MFR BLD: 5.9 %
HDLC SERPL-MCNC: 41 MG/DL (ref 40–60)
LDLC SERPL CALC-MCNC: 44 MG/DL (ref 0–100)
LDLC/HDLC SERPL: 1.12 {RATIO}
Lab: NORMAL
POTASSIUM SERPL-SCNC: 3.9 MMOL/L (ref 3.5–5.2)
PROT SERPL-MCNC: 7.5 G/DL (ref 6–8.5)
SODIUM SERPL-SCNC: 142 MMOL/L (ref 136–145)
T4 FREE SERPL-MCNC: 1.9 NG/DL (ref 0.93–1.7)
TRIGL SERPL-MCNC: 55 MG/DL (ref 0–150)
TSH SERPL DL<=0.05 MIU/L-ACNC: 0.8 UIU/ML (ref 0.27–4.2)
VLDLC SERPL-MCNC: 13 MG/DL (ref 5–40)

## 2022-01-03 PROCEDURE — 84439 ASSAY OF FREE THYROXINE: CPT | Performed by: PHYSICIAN ASSISTANT

## 2022-01-03 PROCEDURE — 80061 LIPID PANEL: CPT | Performed by: PHYSICIAN ASSISTANT

## 2022-01-03 PROCEDURE — 99214 OFFICE O/P EST MOD 30 MIN: CPT | Performed by: PHYSICIAN ASSISTANT

## 2022-01-03 PROCEDURE — 83036 HEMOGLOBIN GLYCOSYLATED A1C: CPT | Performed by: PHYSICIAN ASSISTANT

## 2022-01-03 PROCEDURE — 80053 COMPREHEN METABOLIC PANEL: CPT | Performed by: PHYSICIAN ASSISTANT

## 2022-01-03 PROCEDURE — 82947 ASSAY GLUCOSE BLOOD QUANT: CPT | Performed by: PHYSICIAN ASSISTANT

## 2022-01-03 PROCEDURE — 84443 ASSAY THYROID STIM HORMONE: CPT | Performed by: PHYSICIAN ASSISTANT

## 2022-01-03 RX ORDER — PIOGLITAZONEHYDROCHLORIDE 45 MG/1
45 TABLET ORAL DAILY
Qty: 90 TABLET | Refills: 1 | Status: SHIPPED | OUTPATIENT
Start: 2022-01-03 | End: 2022-07-11 | Stop reason: SDUPTHER

## 2022-01-03 RX ORDER — INSULIN LISPRO 100 [IU]/ML
INJECTION, SOLUTION INTRAVENOUS; SUBCUTANEOUS
Qty: 72 ML | Refills: 1 | Status: SHIPPED | OUTPATIENT
Start: 2022-01-03 | End: 2022-07-05 | Stop reason: SDUPTHER

## 2022-01-03 RX ORDER — LEVOTHYROXINE SODIUM 0.12 MG/1
125 TABLET ORAL DAILY
Qty: 90 TABLET | Refills: 1 | Status: SHIPPED | OUTPATIENT
Start: 2022-01-03 | End: 2022-01-04

## 2022-01-03 RX ORDER — INSULIN GLARGINE 300 U/ML
INJECTION, SOLUTION SUBCUTANEOUS
Qty: 54 ML | Refills: 1 | Status: SHIPPED | OUTPATIENT
Start: 2022-01-03 | End: 2022-07-11 | Stop reason: SDUPTHER

## 2022-01-03 RX ORDER — EZETIMIBE 10 MG/1
10 TABLET ORAL DAILY
Qty: 90 TABLET | Refills: 1 | Status: SHIPPED | OUTPATIENT
Start: 2022-01-03 | End: 2022-07-11 | Stop reason: SDUPTHER

## 2022-01-03 NOTE — PROGRESS NOTES
Chief Complaint  F/u for Diabetes Mellitus and Hypothyroidism.    HPI   Emily Valdez is a 61 y.o. female with CKD 3 who is here today for f/u of Diabetes Mellitus type 2 and hypothyroidism. The initial diagnosis of diabetes was made at age 34.    She reports <10 episodes of hypoglycemia in the last 6 months.     A1C- 5.9 (1/3/2021), 6.3 (6/2021), 6.5 (11/10/2020), 7.7 (8/11/2020), 7.9 (3/16/2020), 8.3 (12/11/19), 7.6 (9/4/19), 7.5 (6/3/19), 7.2 (2/27/19), 12 (10/11/18), 7.3 (9/18/17)    Diabetic complications: cardiovascular disease s/p multiple MIs followed by Dr Gagnon. She has tingling in both feet x 20 years.   Eye exam current (within one year): utd 8/2021. 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 Dr. Francisco hurtado q 4 months, no retinopathy  Foot care and dental care: discussed    Current diabetic medications include:  Actos 45mg qd - tolerating well  Toujeo 60U QHS   Humalog 10-25 units with meals  Statin: lipitor 80    Past medications: Byetta (n/v), metformin (dc d/t decreased renal fxn), trajenta, glyburide    Diabetic Monitoring  -   Checking 3-4x/day  Fasting blood sugar ranging from 80s to 120s mostly, before lunch mostly 90s to 130s, before supper mostly 80s-170s, no hypoglycemia    Did not like Freestyle Janusz- thought it wasn't accurate     Hypothyroidism- dx in her late 20s, on levothyroxine 125 mcg daily    The following portions of the patient's history were reviewed and updated by me as appropriate: allergies, current medications, past family history, past social history, past surgical history and problem list.        History reviewed. No pertinent past medical history.    Medications    Current Outpatient Medications:   •  amitriptyline (ELAVIL) 10 MG tablet, , Disp: , Rfl:   •  amLODIPine (NORVASC) 10 MG tablet, , Disp: , Rfl:   •  aspirin 81 MG EC tablet, Take 81 mg by mouth Daily., Disp: , Rfl:   •  atorvastatin (LIPITOR) 80 MG tablet, , Disp: , Rfl:   •  bisoprolol (ZEBeta) 5 MG tablet, , Disp: , Rfl:    •  BRILINTA 90 MG tablet tablet, , Disp: , Rfl:   •  ezetimibe (Zetia) 10 MG tablet, Take 1 tablet by mouth Daily., Disp: 90 tablet, Rfl: 1  •  furosemide (LASIX) 40 MG tablet, Take 40 mg by mouth 2 (Two) Times a Day., Disp: , Rfl:   •  Insulin Glargine, 1 Unit Dial, (Toujeo SoloStar) 300 UNIT/ML solution pen-injector injection, Take 60u qhs, Disp: 54 mL, Rfl: 1  •  Insulin Lispro, 1 Unit Dial, (HumaLOG KwikPen) 100 UNIT/ML solution pen-injector, Take 10-25 units with meals TID, MDD 75 units, Disp: 72 mL, Rfl: 1  •  Insulin Pen Needle 32G X 4 MM misc, Use QID, Disp: 400 each, Rfl: 3  •  Lancets 30G misc, Use as directed to test blood sugar 3-4 times a day, Disp: 100 each, Rfl: 5  •  levothyroxine (SYNTHROID, LEVOTHROID) 125 MCG tablet, Take 1 tablet by mouth Daily., Disp: 90 tablet, Rfl: 1  •  nitroglycerin (NITROSTAT) 0.4 MG SL tablet, , Disp: , Rfl:   •  pantoprazole (PROTONIX) 40 MG EC tablet, , Disp: , Rfl:   •  pioglitazone (ACTOS) 45 MG tablet, Take 1 tablet by mouth Daily., Disp: 90 tablet, Rfl: 1  •  potassium chloride (MICRO-K) 10 MEQ CR capsule, , Disp: , Rfl:   •  valsartan (DIOVAN) 320 MG tablet, , Disp: , Rfl:     Review of Systems  Review of Systems   Constitutional: Negative for chills, fatigue, fever and unexpected weight change.   HENT: Positive for hearing loss (chronic). Negative for ear pain, nosebleeds, rhinorrhea and sore throat.    Eyes: Negative for pain, discharge, redness and itching.   Respiratory: Negative for cough, shortness of breath and wheezing.    Cardiovascular: Positive for leg swelling (chronic). Negative for chest pain and palpitations.   Gastrointestinal: Negative for abdominal pain, blood in stool, constipation and diarrhea.   Endocrine: Negative for cold intolerance, heat intolerance and polydipsia.   Genitourinary: Negative for dysuria, frequency, hematuria, pelvic pain, vaginal bleeding and vaginal discharge.   Musculoskeletal: Negative for arthralgias, gait problem,  "joint swelling and myalgias.   Skin: Negative for rash.   Allergic/Immunologic: Negative.    Neurological: Negative for dizziness, syncope, weakness, numbness and headaches.   Hematological: Negative for adenopathy.   Psychiatric/Behavioral: Negative for sleep disturbance and suicidal ideas. The patient is not nervous/anxious.         Physical Exam    /64   Pulse 67   Ht 157.5 cm (62\")   Wt (!) 143 kg (314 lb 12.8 oz)   SpO2 99%   BMI 57.58 kg/m² Body mass index is 57.58 kg/m².  Physical Exam   Constitutional: She is oriented to person, place, and time. She appears well-developed. No distress.   HENT:   Head: Normocephalic.   Right Ear: External ear normal.   Left Ear: External ear normal.   Mouth/Throat: No oropharyngeal exudate.   Eyes: Conjunctivae and lids are normal. Right eye exhibits no discharge. Left eye exhibits no discharge. Right pupil is reactive. Left pupil is reactive.   Neck: No JVD present. No tracheal deviation present. No thyroid mass and no thyromegaly present.   Cardiovascular: Normal rate, regular rhythm and normal heart sounds.   No murmur heard.  Pulmonary/Chest: Effort normal and breath sounds normal. No respiratory distress. She has no wheezes.   Abdominal: Soft. Bowel sounds are normal. There is no abdominal tenderness.   Musculoskeletal: No tenderness.   Lymphadenopathy:     She has no cervical adenopathy.   Neurological: She is alert and oriented to person, place, and time.   Skin: Skin is warm and dry. No rash noted. She is not diaphoretic. No erythema.   Psychiatric: Her speech is normal and behavior is normal. Thought content normal.       Labs and Imaging   Lab Results   Component Value Date    HGBA1C 5.9 01/03/2022    HGBA1C 6.2 06/22/2021    HGBA1C 6.1 02/22/2021     Office Visit on 01/03/2022   Component Date Value Ref Range Status   • Glucose 01/03/2022 120  70 - 130 mg/dL Final   • Hemoglobin A1C 01/03/2022 5.9  % Final   • Lot Number 01/03/2022 [id number]   Final   • " Expiration Date 01/03/2022 8,[id number]   Final     No images are attached to the encounter or orders placed in the encounter.  No Images in the past 120 days found..    Assessment / Plan   Diagnoses and all orders for this visit:    1. Controlled type 2 diabetes with neuropathy (HCC) (Primary)  -     POC Glucose, Blood  -     POC Glycosylated Hemoglobin (Hb A1C)  -     Insulin Glargine, 1 Unit Dial, (Toujeo SoloStar) 300 UNIT/ML solution pen-injector injection; Take 60u qhs  Dispense: 54 mL; Refill: 1  -     Insulin Lispro, 1 Unit Dial, (HumaLOG KwikPen) 100 UNIT/ML solution pen-injector; Take 10-25 units with meals TID, MDD 75 units  Dispense: 72 mL; Refill: 1  -     pioglitazone (ACTOS) 45 MG tablet; Take 1 tablet by mouth Daily.  Dispense: 90 tablet; Refill: 1  -     Comprehensive Metabolic Panel  -     Lipid Panel    2. Acquired hypothyroidism  -     levothyroxine (SYNTHROID, LEVOTHROID) 125 MCG tablet; Take 1 tablet by mouth Daily.  Dispense: 90 tablet; Refill: 1  -     TSH  -     T4, Free    3. Mixed hyperlipidemia  -     ezetimibe (Zetia) 10 MG tablet; Take 1 tablet by mouth Daily.  Dispense: 90 tablet; Refill: 1  -     Comprehensive Metabolic Panel  -     Lipid Panel        Diabetes Mellitus 2 is now under good control  -with peripheral neuropathy, CAD, CKD3  -A1c 5.9  -Checking 3-4x/day - bg readings range from 80s-170s without hypoglycemia  -humalog- continue 10-25 units with meals  -continue toujeo to 60U qhs.   -cont actos  -foot exam updated 2/2021, labs today, microalb due 2/2022, eye exam q 4 months with dr miguelito hurtado        1.  Diet: 3-4 carb servings per meal for females, 4-5 carb servings per meal for males  Spread carb intake throughout the day  Increase lean protein and vegetable intake  Avoid sugary drinks and processed carbs including crackers, cookies, cakes  2.  Exercise: Recommend at least 30 minutes of exercise daily, at least 5 days per week. Increase exercise gradually.   3.  Blood Glucose  Goal: Blood glucose goal <150 fasting, <180 2 hr postprandial  4.  Microalbumin due 2/2022  5.  Education performed during this visit: long term diabetic complications discussed. , annual eye examinations at Ophthalmology discussed, dental hygiene discussed  and foot care reviewed., home glucose monitoring emphasized, all medications, side effects and compliance discussed carefully and Hypoglycemia management and prevention reviewed. Reviewed ‘ABCs’ of diabetes management (respective goals in parentheses):  A1C (<7), blood pressure (<130/80), and cholesterol (LDL <100, if CVD <70).    Hypothyroidism  -cont levothyroxine 137mcg daily.  -repeat TFTs today    Hyperlipidemia  -repeat lipids today - pt is fasting  -she is on lipitor 80 and zetia 10       There are no Patient Instructions on file for this visit.    Follow up: Return in about 6 months (around 7/3/2022).     Discussed the nature of the disease including, risks, complications, implications, management, safe and proper use of medications. Encouraged therapeutic lifestyle changes including low calorie diet with plenty of fruits and vegetables, daily exercise, medication compliance, and keeping scheduled follow up appointments with me and any other providers. Encouraged patient to have appointment for complete physical, fasting labs, appropriate screenings, and immunizations on an annual basis.    Awilda Conn PA-C

## 2022-01-04 RX ORDER — LEVOTHYROXINE SODIUM 112 UG/1
112 TABLET ORAL DAILY
Qty: 90 TABLET | Refills: 1 | Status: SHIPPED | OUTPATIENT
Start: 2022-01-04 | End: 2022-07-05 | Stop reason: SDUPTHER

## 2022-07-04 DIAGNOSIS — E11.40 CONTROLLED TYPE 2 DIABETES WITH NEUROPATHY: Chronic | ICD-10-CM

## 2022-07-05 DIAGNOSIS — E11.40 CONTROLLED TYPE 2 DIABETES WITH NEUROPATHY: Chronic | ICD-10-CM

## 2022-07-05 RX ORDER — INSULIN LISPRO 100 [IU]/ML
INJECTION, SOLUTION INTRAVENOUS; SUBCUTANEOUS
Qty: 72 ML | Refills: 0 | Status: CANCELLED | OUTPATIENT
Start: 2022-07-05

## 2022-07-05 RX ORDER — LEVOTHYROXINE SODIUM 112 UG/1
112 TABLET ORAL DAILY
Qty: 90 TABLET | Refills: 0 | Status: CANCELLED | OUTPATIENT
Start: 2022-07-05 | End: 2023-07-05

## 2022-07-05 NOTE — TELEPHONE ENCOUNTER
Fax Rx refill request from [facility] for the following:  Levothyroixine 112, Humalog, Last OV 1/3/22 Future appt 7/11/22

## 2022-07-06 RX ORDER — LEVOTHYROXINE SODIUM 112 UG/1
112 TABLET ORAL DAILY
Qty: 90 TABLET | Refills: 1 | Status: SHIPPED | OUTPATIENT
Start: 2022-07-06 | End: 2023-01-02 | Stop reason: SDUPTHER

## 2022-07-06 RX ORDER — INSULIN LISPRO 100 [IU]/ML
INJECTION, SOLUTION INTRAVENOUS; SUBCUTANEOUS
Qty: 68 ML | Refills: 1 | Status: SHIPPED | OUTPATIENT
Start: 2022-07-06 | End: 2023-03-31 | Stop reason: SDUPTHER

## 2022-07-11 ENCOUNTER — OFFICE VISIT (OUTPATIENT)
Dept: ENDOCRINOLOGY | Facility: CLINIC | Age: 62
End: 2022-07-11

## 2022-07-11 ENCOUNTER — LAB (OUTPATIENT)
Dept: LAB | Facility: HOSPITAL | Age: 62
End: 2022-07-11

## 2022-07-11 VITALS
OXYGEN SATURATION: 100 % | DIASTOLIC BLOOD PRESSURE: 64 MMHG | HEIGHT: 62 IN | WEIGHT: 293 LBS | HEART RATE: 61 BPM | BODY MASS INDEX: 53.92 KG/M2 | SYSTOLIC BLOOD PRESSURE: 118 MMHG

## 2022-07-11 DIAGNOSIS — E78.2 MIXED HYPERLIPIDEMIA: Chronic | ICD-10-CM

## 2022-07-11 DIAGNOSIS — E11.40 CONTROLLED TYPE 2 DIABETES WITH NEUROPATHY: Primary | Chronic | ICD-10-CM

## 2022-07-11 DIAGNOSIS — E03.9 ACQUIRED HYPOTHYROIDISM: Chronic | ICD-10-CM

## 2022-07-11 LAB
ALBUMIN UR-MCNC: <1.2 MG/DL
CREAT UR-MCNC: 20.7 MG/DL
EXPIRATION DATE: NORMAL
EXPIRATION DATE: NORMAL
GLUCOSE BLDC GLUCOMTR-MCNC: 112 MG/DL (ref 70–130)
HBA1C MFR BLD: 5.7 %
Lab: NORMAL
Lab: NORMAL
MICROALBUMIN/CREAT UR: NORMAL MG/G{CREAT}

## 2022-07-11 PROCEDURE — 80053 COMPREHEN METABOLIC PANEL: CPT | Performed by: PHYSICIAN ASSISTANT

## 2022-07-11 PROCEDURE — 99214 OFFICE O/P EST MOD 30 MIN: CPT | Performed by: PHYSICIAN ASSISTANT

## 2022-07-11 PROCEDURE — 82043 UR ALBUMIN QUANTITATIVE: CPT | Performed by: PHYSICIAN ASSISTANT

## 2022-07-11 PROCEDURE — 82570 ASSAY OF URINE CREATININE: CPT | Performed by: PHYSICIAN ASSISTANT

## 2022-07-11 PROCEDURE — 82947 ASSAY GLUCOSE BLOOD QUANT: CPT | Performed by: PHYSICIAN ASSISTANT

## 2022-07-11 PROCEDURE — 80061 LIPID PANEL: CPT | Performed by: PHYSICIAN ASSISTANT

## 2022-07-11 PROCEDURE — 83036 HEMOGLOBIN GLYCOSYLATED A1C: CPT | Performed by: PHYSICIAN ASSISTANT

## 2022-07-11 PROCEDURE — 84443 ASSAY THYROID STIM HORMONE: CPT | Performed by: PHYSICIAN ASSISTANT

## 2022-07-11 PROCEDURE — 84439 ASSAY OF FREE THYROXINE: CPT | Performed by: PHYSICIAN ASSISTANT

## 2022-07-11 RX ORDER — EZETIMIBE 10 MG/1
10 TABLET ORAL DAILY
Qty: 90 TABLET | Refills: 1 | Status: SHIPPED | OUTPATIENT
Start: 2022-07-11 | End: 2023-03-31 | Stop reason: SDUPTHER

## 2022-07-11 RX ORDER — INSULIN GLARGINE 300 U/ML
INJECTION, SOLUTION SUBCUTANEOUS
Qty: 18 ML | Refills: 1 | Status: SHIPPED | OUTPATIENT
Start: 2022-07-11

## 2022-07-11 RX ORDER — PIOGLITAZONEHYDROCHLORIDE 45 MG/1
45 TABLET ORAL DAILY
Qty: 90 TABLET | Refills: 1 | Status: SHIPPED | OUTPATIENT
Start: 2022-07-11 | End: 2023-01-09 | Stop reason: ALTCHOICE

## 2022-07-11 NOTE — PROGRESS NOTES
Chief Complaint  F/u for Diabetes Mellitus and Hypothyroidism.    HPI   Emily Valdez is a 62 y.o. female with CKD 3 who is here today for f/u of Diabetes Mellitus type 2 and hypothyroidism. The initial diagnosis of diabetes was made at age 34.    Diabetic complications: cardiovascular disease s/p multiple MIs followed by Dr Gagnon. She has tingling in both feet x 20 years.   Eye exam current (within one year): utd 8/2021. 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 Dr. Francisco hurtado q 4 months, no retinopathy    Current diabetic medications include:  Actos 45mg qd - tolerating well  Toujeo 60U QHS   Humalog 20-25 units with meals  Statin: lipitor 80    Past medications: Byetta (n/v), metformin (dc d/t decreased renal fxn), trajenta, glyburide    Diabetic Monitoring  -   Checking 2x/day  BG mostly 100-130s, no hypoglycemia    Did not like Freestyle Janusz- was not accurate for her     Hypothyroidism- dx in her late 20s, on levothyroxine 125 mcg daily    The following portions of the patient's history were reviewed and updated by me as appropriate: allergies, current medications, past family history, past social history, past surgical history and problem list.        History reviewed. No pertinent past medical history.    Medications    Current Outpatient Medications:   •  ezetimibe (Zetia) 10 MG tablet, Take 1 tablet by mouth Daily., Disp: 90 tablet, Rfl: 1  •  Insulin Glargine, 1 Unit Dial, (Toujeo SoloStar) 300 UNIT/ML solution pen-injector injection, Take 60u qhs, Disp: 18 mL, Rfl: 1  •  pioglitazone (ACTOS) 45 MG tablet, Take 1 tablet by mouth Daily., Disp: 90 tablet, Rfl: 1  •  amitriptyline (ELAVIL) 10 MG tablet, , Disp: , Rfl:   •  amLODIPine (NORVASC) 10 MG tablet, , Disp: , Rfl:   •  aspirin 81 MG EC tablet, Take 81 mg by mouth Daily., Disp: , Rfl:   •  atorvastatin (LIPITOR) 80 MG tablet, , Disp: , Rfl:   •  bisoprolol (ZEBeta) 5 MG tablet, , Disp: , Rfl:   •  BRILINTA 90 MG tablet tablet, , Disp: , Rfl:   •  furosemide  "(LASIX) 40 MG tablet, Take 40 mg by mouth 2 (Two) Times a Day., Disp: , Rfl:   •  Insulin Lispro, 1 Unit Dial, (HumaLOG KwikPen) 100 UNIT/ML solution pen-injector, Take 10-25 units with meals TID, MDD 75 units, Disp: 68 mL, Rfl: 1  •  Insulin Pen Needle 32G X 4 MM misc, Use QID, Disp: 400 each, Rfl: 3  •  Lancets 30G misc, Use as directed to test blood sugar 3-4 times a day, Disp: 100 each, Rfl: 5  •  levothyroxine (Synthroid) 112 MCG tablet, Take 1 tablet by mouth Daily., Disp: 90 tablet, Rfl: 1  •  nitroglycerin (NITROSTAT) 0.4 MG SL tablet, , Disp: , Rfl:   •  pantoprazole (PROTONIX) 40 MG EC tablet, , Disp: , Rfl:   •  potassium chloride (MICRO-K) 10 MEQ CR capsule, , Disp: , Rfl:   •  valsartan (DIOVAN) 320 MG tablet, , Disp: , Rfl:     Review of Systems  Review of Systems   Constitutional: Negative for chills, fatigue, fever and unexpected weight change.   HENT: Positive for hearing loss (chronic). Negative for ear pain, nosebleeds, rhinorrhea and sore throat.    Eyes: Negative for pain, discharge, redness and itching.   Respiratory: Negative for cough, shortness of breath and wheezing.    Cardiovascular: Positive for leg swelling (chronic). Negative for chest pain and palpitations.   Gastrointestinal: Negative for abdominal pain, blood in stool, constipation and diarrhea.   Endocrine: Negative for cold intolerance, heat intolerance and polydipsia.   Genitourinary: Negative for dysuria, frequency, hematuria, pelvic pain, vaginal bleeding and vaginal discharge.   Musculoskeletal: Negative for arthralgias, gait problem, joint swelling and myalgias.   Skin: Negative for rash.   Allergic/Immunologic: Negative.    Neurological: Negative for dizziness, syncope, weakness, numbness and headaches.   Hematological: Negative for adenopathy.   Psychiatric/Behavioral: Negative for sleep disturbance and suicidal ideas. The patient is not nervous/anxious.         Physical Exam    /64   Pulse 61   Ht 157.5 cm (62\")   " Wt (!) 138 kg (304 lb 3.2 oz)   SpO2 100%   BMI 55.64 kg/m² Body mass index is 55.64 kg/m².  Physical Exam   Constitutional: She is oriented to person, place, and time. She appears well-developed. No distress.   HENT:   Head: Normocephalic.   Right Ear: External ear normal.   Left Ear: External ear normal.   Mouth/Throat: No oropharyngeal exudate.   Eyes: Conjunctivae and lids are normal. Right eye exhibits no discharge. Left eye exhibits no discharge. Right pupil is reactive. Left pupil is reactive.   Neck: No JVD present. No tracheal deviation present. No thyroid mass and no thyromegaly present.   Cardiovascular: Normal rate, regular rhythm and normal heart sounds.   No murmur heard.  Pulmonary/Chest: Effort normal and breath sounds normal. No respiratory distress. She has no wheezes.   Musculoskeletal: No tenderness.    Emily had a diabetic foot exam performed today.   During the foot exam she had a monofilament test performed.    Neurological Sensory Findings - Altered hot/cold right ankle/foot discrimination and altered hot/cold left ankle/foot discrimination. Altered sharp/dull right ankle/foot discrimination and altered sharp/dull left ankle/foot discrimination. Right ankle/foot altered proprioception and left ankle/foot altered proprioception.  Vascular Status -  Her right foot exhibits normal foot vasculature  and no edema. Her left foot exhibits normal foot vasculature  and no edema.  Skin Integrity  -  Her right foot skin is intact. She has callous right foot.  She has no right foot onychomycosis and no right foot ulcer.Her left foot skin is intact.She has callous left foot. She has no left foot onychomycosis and no left foot ulcer..  Lymphadenopathy:     She has no cervical adenopathy.   Neurological: She is alert and oriented to person, place, and time.   Skin: Skin is warm and dry. No rash noted. She is not diaphoretic. No erythema.   Psychiatric: Her speech is normal and behavior is normal. Thought  content normal.       Labs and Imaging   Lab Results   Component Value Date    HGBA1C 5.7 07/11/2022    HGBA1C 5.9 01/03/2022    HGBA1C 6.2 06/22/2021     Office Visit on 07/11/2022   Component Date Value Ref Range Status   • Hemoglobin A1C 07/11/2022 5.7  % Final   • Lot Number 07/11/2022 [id number]   Final   • Expiration Date 07/11/2022 03/01/2024   Final   • Glucose 07/11/2022 112  70 - 130 mg/dL Final   • Lot Number 07/11/2022 [id number]   Final   • Expiration Date 07/11/2022 01/28/2023   Final     No images are attached to the encounter or orders placed in the encounter.  No Images in the past 120 days found..    Assessment / Plan   Diagnoses and all orders for this visit:    1. Controlled type 2 diabetes with neuropathy (HCC) (Primary)  -     POC Glycosylated Hemoglobin (Hb A1C)  -     POC Glucose, Blood  -     Insulin Glargine, 1 Unit Dial, (Toujeo SoloStar) 300 UNIT/ML solution pen-injector injection; Take 60u qhs  Dispense: 18 mL; Refill: 1  -     pioglitazone (ACTOS) 45 MG tablet; Take 1 tablet by mouth Daily.  Dispense: 90 tablet; Refill: 1  -     Microalbumin / Creatinine Urine Ratio - Urine, Clean Catch  -     Comprehensive Metabolic Panel  -     Lipid Panel    2. Mixed hyperlipidemia  -     ezetimibe (Zetia) 10 MG tablet; Take 1 tablet by mouth Daily.  Dispense: 90 tablet; Refill: 1  -     Comprehensive Metabolic Panel  -     Lipid Panel    3. Acquired hypothyroidism  -     TSH  -     T4, Free        Diabetes Mellitus 2 is now under good control  -with peripheral neuropathy, CAD, CKD3  -A1c 5.7  -BG mostly 100-130s, no hypoglycemia  -humalog- continue 20-25 units with meals  -continue toujeo to 60U qhs.   -cont actos  -foot exam updated today, labs today, eye exam q 4 months with dr miguelito hurtado  -discussed foot care    Hypothyroidism  -cont levothyroxine 137mcg daily.  -repeat TFTs today    Hyperlipidemia  -repeat lipids today   -she is on lipitor 80 and zetia 10       There are no Patient Instructions  on file for this visit.    Follow up: Return in about 6 months (around 1/11/2023).     Awilda Conn PA-C

## 2022-07-12 LAB
ALBUMIN SERPL-MCNC: 4.3 G/DL (ref 3.5–5.2)
ALBUMIN/GLOB SERPL: 1.1 G/DL
ALP SERPL-CCNC: 138 U/L (ref 39–117)
ALT SERPL W P-5'-P-CCNC: 11 U/L (ref 1–33)
ANION GAP SERPL CALCULATED.3IONS-SCNC: 15.6 MMOL/L (ref 5–15)
AST SERPL-CCNC: 17 U/L (ref 1–32)
BILIRUB SERPL-MCNC: 0.5 MG/DL (ref 0–1.2)
BUN SERPL-MCNC: 23 MG/DL (ref 8–23)
BUN/CREAT SERPL: 14.6 (ref 7–25)
CALCIUM SPEC-SCNC: 10 MG/DL (ref 8.6–10.5)
CHLORIDE SERPL-SCNC: 101 MMOL/L (ref 98–107)
CHOLEST SERPL-MCNC: 105 MG/DL (ref 0–200)
CO2 SERPL-SCNC: 25.4 MMOL/L (ref 22–29)
CREAT SERPL-MCNC: 1.57 MG/DL (ref 0.57–1)
EGFRCR SERPLBLD CKD-EPI 2021: 37.6 ML/MIN/1.73
GLOBULIN UR ELPH-MCNC: 3.8 GM/DL
GLUCOSE SERPL-MCNC: 101 MG/DL (ref 65–99)
HDLC SERPL-MCNC: 34 MG/DL (ref 40–60)
LDLC SERPL CALC-MCNC: 52 MG/DL (ref 0–100)
LDLC/HDLC SERPL: 1.51 {RATIO}
POTASSIUM SERPL-SCNC: 3.7 MMOL/L (ref 3.5–5.2)
PROT SERPL-MCNC: 8.1 G/DL (ref 6–8.5)
SODIUM SERPL-SCNC: 142 MMOL/L (ref 136–145)
T4 FREE SERPL-MCNC: 1.77 NG/DL (ref 0.93–1.7)
TRIGL SERPL-MCNC: 98 MG/DL (ref 0–150)
TSH SERPL DL<=0.05 MIU/L-ACNC: 2.42 UIU/ML (ref 0.27–4.2)
VLDLC SERPL-MCNC: 19 MG/DL (ref 5–40)

## 2023-01-03 RX ORDER — LEVOTHYROXINE SODIUM 112 UG/1
112 TABLET ORAL DAILY
Qty: 90 TABLET | Refills: 0 | Status: SHIPPED | OUTPATIENT
Start: 2023-01-03 | End: 2023-03-31 | Stop reason: SDUPTHER

## 2023-01-09 ENCOUNTER — OFFICE VISIT (OUTPATIENT)
Dept: ENDOCRINOLOGY | Facility: CLINIC | Age: 63
End: 2023-01-09
Payer: COMMERCIAL

## 2023-01-09 ENCOUNTER — LAB (OUTPATIENT)
Dept: LAB | Facility: HOSPITAL | Age: 63
End: 2023-01-09
Payer: COMMERCIAL

## 2023-01-09 VITALS
SYSTOLIC BLOOD PRESSURE: 116 MMHG | BODY MASS INDEX: 53.92 KG/M2 | OXYGEN SATURATION: 99 % | HEIGHT: 62 IN | DIASTOLIC BLOOD PRESSURE: 68 MMHG | WEIGHT: 293 LBS | HEART RATE: 67 BPM

## 2023-01-09 DIAGNOSIS — N18.32 STAGE 3B CHRONIC KIDNEY DISEASE: ICD-10-CM

## 2023-01-09 DIAGNOSIS — E03.9 ACQUIRED HYPOTHYROIDISM: Chronic | ICD-10-CM

## 2023-01-09 DIAGNOSIS — E78.2 MIXED HYPERLIPIDEMIA: Chronic | ICD-10-CM

## 2023-01-09 DIAGNOSIS — E11.40 CONTROLLED TYPE 2 DIABETES WITH NEUROPATHY: Primary | ICD-10-CM

## 2023-01-09 LAB
ALBUMIN SERPL-MCNC: 4.1 G/DL (ref 3.5–5.2)
ALBUMIN/GLOB SERPL: 1.1 G/DL
ALP SERPL-CCNC: 142 U/L (ref 39–117)
ALT SERPL W P-5'-P-CCNC: 7 U/L (ref 1–33)
ANION GAP SERPL CALCULATED.3IONS-SCNC: 8.5 MMOL/L (ref 5–15)
AST SERPL-CCNC: 15 U/L (ref 1–32)
BILIRUB SERPL-MCNC: 0.5 MG/DL (ref 0–1.2)
BUN SERPL-MCNC: 13 MG/DL (ref 8–23)
BUN/CREAT SERPL: 9.2 (ref 7–25)
CALCIUM SPEC-SCNC: 10 MG/DL (ref 8.6–10.5)
CHLORIDE SERPL-SCNC: 103 MMOL/L (ref 98–107)
CHOLEST SERPL-MCNC: 107 MG/DL (ref 0–200)
CO2 SERPL-SCNC: 29.5 MMOL/L (ref 22–29)
CREAT SERPL-MCNC: 1.41 MG/DL (ref 0.57–1)
EGFRCR SERPLBLD CKD-EPI 2021: 42.8 ML/MIN/1.73
EXPIRATION DATE: NORMAL
EXPIRATION DATE: NORMAL
GLOBULIN UR ELPH-MCNC: 3.6 GM/DL
GLUCOSE BLDC GLUCOMTR-MCNC: 96 MG/DL (ref 70–130)
GLUCOSE SERPL-MCNC: 82 MG/DL (ref 65–99)
HBA1C MFR BLD: 5.7 %
HDLC SERPL-MCNC: 33 MG/DL (ref 40–60)
LDLC SERPL CALC-MCNC: 57 MG/DL (ref 0–100)
LDLC/HDLC SERPL: 1.72 {RATIO}
Lab: NORMAL
Lab: NORMAL
POTASSIUM SERPL-SCNC: 4.1 MMOL/L (ref 3.5–5.2)
PROT SERPL-MCNC: 7.7 G/DL (ref 6–8.5)
SODIUM SERPL-SCNC: 141 MMOL/L (ref 136–145)
T4 FREE SERPL-MCNC: 1.72 NG/DL (ref 0.93–1.7)
TRIGL SERPL-MCNC: 86 MG/DL (ref 0–150)
TSH SERPL DL<=0.05 MIU/L-ACNC: 2.15 UIU/ML (ref 0.27–4.2)
VLDLC SERPL-MCNC: 17 MG/DL (ref 5–40)

## 2023-01-09 PROCEDURE — 84443 ASSAY THYROID STIM HORMONE: CPT | Performed by: PHYSICIAN ASSISTANT

## 2023-01-09 PROCEDURE — 84439 ASSAY OF FREE THYROXINE: CPT | Performed by: PHYSICIAN ASSISTANT

## 2023-01-09 PROCEDURE — 99214 OFFICE O/P EST MOD 30 MIN: CPT | Performed by: PHYSICIAN ASSISTANT

## 2023-01-09 PROCEDURE — 82947 ASSAY GLUCOSE BLOOD QUANT: CPT | Performed by: PHYSICIAN ASSISTANT

## 2023-01-09 PROCEDURE — 80061 LIPID PANEL: CPT | Performed by: PHYSICIAN ASSISTANT

## 2023-01-09 PROCEDURE — 83036 HEMOGLOBIN GLYCOSYLATED A1C: CPT | Performed by: PHYSICIAN ASSISTANT

## 2023-01-09 PROCEDURE — 80053 COMPREHEN METABOLIC PANEL: CPT | Performed by: PHYSICIAN ASSISTANT

## 2023-01-09 RX ORDER — CYCLOBENZAPRINE HCL 10 MG
10 TABLET ORAL 3 TIMES DAILY PRN
COMMUNITY
Start: 2022-11-22

## 2023-01-09 RX ORDER — ALBUTEROL SULFATE 90 UG/1
1 AEROSOL, METERED RESPIRATORY (INHALATION) AS NEEDED
COMMUNITY
Start: 2023-01-02

## 2023-01-09 RX ORDER — DAPAGLIFLOZIN 10 MG/1
10 TABLET, FILM COATED ORAL DAILY
Qty: 90 TABLET | Refills: 1 | Status: SHIPPED | OUTPATIENT
Start: 2023-01-09 | End: 2023-03-31 | Stop reason: SDUPTHER

## 2023-01-09 NOTE — PROGRESS NOTES
Chief Complaint  F/u for Diabetes Mellitus and Hypothyroidism.    HPI   Emily Valdez is a 62 y.o. female with CKD 3 who is here today for f/u of Diabetes Mellitus type 2 and hypothyroidism. The initial diagnosis of diabetes was made at age 34.    Diabetic complications: cardiovascular disease s/p multiple MIs followed by Dr Gagnon. She has tingling in both feet x 20 years.   Eye exam current (within one year): utd 8/2021. 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 Dr. Francisco hurtado q 4 months, no retinopathy    Current diabetic medications include:  Actos 45mg qd - tolerating well  Toujeo 60U QHS   Humalog 20-25 units with meals  Statin: lipitor 80    Past medications: Byetta (n/v), metformin (dc d/t decreased renal fxn), trajenta, glyburide    Diabetic Monitoring  -   Checking 2x/day  BG mostly 100-120s, no hypoglycemia    Did not like Freestyle Janusz- was not accurate for her     Hypothyroidism- dx in her late 20s, on levothyroxine 125 mcg daily    The following portions of the patient's history were reviewed and updated by me as appropriate: allergies, current medications, past family history, past social history, past surgical history and problem list.      Past Medical History:   Diagnosis Date   • Hypertension in my 30's   • Hypothyroidism in my 30's   • Type 2 diabetes mellitus (HCC) in my 30's       Medications    Current Outpatient Medications:   •  albuterol sulfate  (90 Base) MCG/ACT inhaler, Inhale 1 puff As Needed., Disp: , Rfl:   •  amitriptyline (ELAVIL) 10 MG tablet, Take 10 mg by mouth Every Night., Disp: , Rfl:   •  amLODIPine (NORVASC) 10 MG tablet, Take 10 mg by mouth Daily., Disp: , Rfl:   •  aspirin 81 MG EC tablet, Take 81 mg by mouth Daily., Disp: , Rfl:   •  atorvastatin (LIPITOR) 80 MG tablet, Take 80 mg by mouth Every Night., Disp: , Rfl:   •  bisoprolol (ZEBeta) 5 MG tablet, Take 5 mg by mouth 2 (Two) Times a Day., Disp: , Rfl:   •  BRILINTA 90 MG tablet tablet, Take 90 mg by mouth 2 (Two)  Times a Day., Disp: , Rfl:   •  cyclobenzaprine (FLEXERIL) 10 MG tablet, Take 10 mg by mouth 3 (Three) Times a Day As Needed., Disp: , Rfl:   •  ezetimibe (Zetia) 10 MG tablet, Take 1 tablet by mouth Daily., Disp: 90 tablet, Rfl: 1  •  furosemide (LASIX) 40 MG tablet, Take 40 mg by mouth 2 (Two) Times a Day., Disp: , Rfl:   •  Insulin Glargine, 1 Unit Dial, (Toujeo SoloStar) 300 UNIT/ML solution pen-injector injection, Take 60u qhs, Disp: 18 mL, Rfl: 1  •  Insulin Lispro, 1 Unit Dial, (HumaLOG KwikPen) 100 UNIT/ML solution pen-injector, Take 10-25 units with meals TID, MDD 75 units, Disp: 68 mL, Rfl: 1  •  Insulin Pen Needle 32G X 4 MM misc, Use QID, Disp: 400 each, Rfl: 3  •  Lancets 30G misc, Use as directed to test blood sugar 3-4 times a day, Disp: 100 each, Rfl: 5  •  levothyroxine (Synthroid) 112 MCG tablet, Take 1 tablet by mouth Daily., Disp: 90 tablet, Rfl: 0  •  nitroglycerin (NITROSTAT) 0.4 MG SL tablet, Place 0.4 mg under the tongue As Needed., Disp: , Rfl:   •  pantoprazole (PROTONIX) 40 MG EC tablet, Take 40 mg by mouth Daily., Disp: , Rfl:   •  potassium chloride (MICRO-K) 10 MEQ CR capsule, Take 10 mEq by mouth Daily., Disp: , Rfl:   •  valsartan (DIOVAN) 320 MG tablet, Take 320 mg by mouth Daily., Disp: , Rfl:   •  dapagliflozin Propanediol (Farxiga) 10 MG tablet, Take 10 mg by mouth Daily., Disp: 90 tablet, Rfl: 1    Review of Systems  Review of Systems   All other systems reviewed and are negative.       Physical Exam    /68   Pulse 67   Ht 157.5 cm (62\")   Wt (!) 140 kg (309 lb)   SpO2 99%   BMI 56.52 kg/m² Body mass index is 56.52 kg/m².  Physical Exam   Constitutional: She is oriented to person, place, and time. She appears well-developed. No distress.   HENT:   Head: Normocephalic.   Right Ear: External ear normal.   Left Ear: External ear normal.   Mouth/Throat: No oropharyngeal exudate.   Eyes: Conjunctivae and lids are normal. Right eye exhibits no discharge. Left eye exhibits no  discharge. Right pupil is reactive. Left pupil is reactive.   Neck: No JVD present. No tracheal deviation present. No thyroid mass and no thyromegaly present.   Cardiovascular: Normal rate, regular rhythm and normal heart sounds.   No murmur heard.  Pulmonary/Chest: Effort normal and breath sounds normal. No respiratory distress. She has no wheezes.   Musculoskeletal: No tenderness.   Lymphadenopathy:     She has no cervical adenopathy.   Neurological: She is alert and oriented to person, place, and time.   Skin: Skin is warm and dry. No rash noted. She is not diaphoretic. No erythema.   Psychiatric: Her speech is normal and behavior is normal. Thought content normal.       Labs and Imaging   Lab Results   Component Value Date    HGBA1C 5.7 01/09/2023    HGBA1C 5.7 07/11/2022    HGBA1C 5.9 01/03/2022     Office Visit on 01/09/2023   Component Date Value Ref Range Status   • Hemoglobin A1C 01/09/2023 5.7  % Final   • Lot Number 01/09/2023 [id number]   Final   • Expiration Date 01/09/2023 10-   Final   • Glucose 01/09/2023 96  70 - 130 mg/dL Final   • Lot Number 01/09/2023 [id number]   Final   • Expiration Date 01/09/2023 07-   Final     No images are attached to the encounter or orders placed in the encounter.  No Images in the past 120 days found..    Assessment / Plan   Diagnoses and all orders for this visit:    1. Controlled type 2 diabetes with neuropathy (HCC) (Primary)  -     POC Glycosylated Hemoglobin (Hb A1C)  -     POC Glucose, Blood  -     Comprehensive Metabolic Panel  -     Lipid Panel  -     dapagliflozin Propanediol (Farxiga) 10 MG tablet; Take 10 mg by mouth Daily.  Dispense: 90 tablet; Refill: 1    2. Acquired hypothyroidism  -     TSH  -     T4, Free    3. Mixed hyperlipidemia  -     Comprehensive Metabolic Panel  -     Lipid Panel    4. Stage 3b chronic kidney disease (HCC)  -     dapagliflozin Propanediol (Farxiga) 10 MG tablet; Take 10 mg by mouth Daily.  Dispense: 90 tablet;  Refill: 1        Diabetes Mellitus 2 is now under good control  -with peripheral neuropathy, CAD, CKD3  -A1c 5.7  -BG mostly 100-120s, no hypoglycemia  -humalog- continue 20-25 units with meals  -continue toujeo to 60U qhs.   -She has a 3-month supply of Actos and when she finishes it she will stop it and start Farxiga 10 mg daily for CKD 3  -foot exam updated 7/2022, labs today, eye exam q 4 months with dr miguelito hurtado    Hypothyroidism  -cont levothyroxine 137mcg daily.  -repeat TFTs today    Hyperlipidemia  -repeat lipids today   -she is on lipitor 80 and zetia 10     CKD 3  -Start Farxiga 10 mg daily      There are no Patient Instructions on file for this visit.    Follow up: Return in about 6 months (around 7/9/2023).     Awilda Conn PA-C

## 2023-01-17 ENCOUNTER — PRIOR AUTHORIZATION (OUTPATIENT)
Dept: ENDOCRINOLOGY | Facility: CLINIC | Age: 63
End: 2023-01-17
Payer: COMMERCIAL

## 2023-01-17 ENCOUNTER — PATIENT MESSAGE (OUTPATIENT)
Dept: ENDOCRINOLOGY | Facility: CLINIC | Age: 63
End: 2023-01-17
Payer: COMMERCIAL

## 2023-03-31 DIAGNOSIS — N18.32 STAGE 3B CHRONIC KIDNEY DISEASE: ICD-10-CM

## 2023-03-31 DIAGNOSIS — E78.2 MIXED HYPERLIPIDEMIA: Chronic | ICD-10-CM

## 2023-03-31 DIAGNOSIS — E11.40 CONTROLLED TYPE 2 DIABETES WITH NEUROPATHY: Chronic | ICD-10-CM

## 2023-03-31 RX ORDER — EZETIMIBE 10 MG/1
10 TABLET ORAL DAILY
Qty: 90 TABLET | Refills: 1 | Status: SHIPPED | OUTPATIENT
Start: 2023-03-31 | End: 2024-03-30

## 2023-03-31 RX ORDER — DAPAGLIFLOZIN 10 MG/1
10 TABLET, FILM COATED ORAL DAILY
Qty: 90 TABLET | Refills: 1 | Status: SHIPPED | OUTPATIENT
Start: 2023-03-31

## 2023-03-31 RX ORDER — LEVOTHYROXINE SODIUM 112 UG/1
112 TABLET ORAL DAILY
Qty: 90 TABLET | Refills: 0 | Status: SHIPPED | OUTPATIENT
Start: 2023-03-31 | End: 2024-03-30

## 2023-03-31 RX ORDER — INSULIN LISPRO 100 [IU]/ML
INJECTION, SOLUTION INTRAVENOUS; SUBCUTANEOUS
Qty: 68 ML | Refills: 1 | Status: SHIPPED | OUTPATIENT
Start: 2023-03-31

## 2023-03-31 NOTE — TELEPHONE ENCOUNTER
PT CALLED REQUESTING EZETIMIBE, SYTNTHROID,HUMALOG, AND FARXIGA TO BE SENT IN TO [facility] IN [place].

## 2023-04-05 ENCOUNTER — PATIENT MESSAGE (OUTPATIENT)
Dept: ENDOCRINOLOGY | Facility: CLINIC | Age: 63
End: 2023-04-05
Payer: COMMERCIAL

## 2023-04-05 RX ORDER — PIOGLITAZONEHYDROCHLORIDE 45 MG/1
45 TABLET ORAL DAILY
Qty: 90 TABLET | Refills: 1 | Status: SHIPPED | OUTPATIENT
Start: 2023-04-05 | End: 2024-04-04

## 2023-04-05 NOTE — TELEPHONE ENCOUNTER
From: Emily Valdez  To: Awilda Conn  Sent: 4/5/2023 11:20 AM EDT  Subject: new med farxiga    I have been on new med for 6-7 weeks and i am having such a hard time getting my bs down. i have increace taugeo to 70 units at night and am doin Humalog 36-44 units with meals and still bs will be 150 or higher....i have done so good for last couple years at keeping my bs down. so i am asking if i can add actos back to help me out???

## 2023-09-30 ENCOUNTER — PATIENT MESSAGE (OUTPATIENT)
Dept: ENDOCRINOLOGY | Facility: CLINIC | Age: 63
End: 2023-09-30
Payer: COMMERCIAL

## 2023-09-30 DIAGNOSIS — E11.40 CONTROLLED TYPE 2 DIABETES WITH NEUROPATHY: Chronic | ICD-10-CM

## 2023-09-30 DIAGNOSIS — N18.32 STAGE 3B CHRONIC KIDNEY DISEASE: ICD-10-CM

## 2023-10-02 RX ORDER — PIOGLITAZONEHYDROCHLORIDE 45 MG/1
45 TABLET ORAL DAILY
Qty: 90 TABLET | Refills: 1 | Status: SHIPPED | OUTPATIENT
Start: 2023-10-02 | End: 2024-10-01

## 2023-10-02 RX ORDER — INSULIN GLARGINE 300 U/ML
60 INJECTION, SOLUTION SUBCUTANEOUS NIGHTLY
Qty: 18 ML | Refills: 1 | Status: SHIPPED | OUTPATIENT
Start: 2023-10-02

## 2023-10-02 RX ORDER — DAPAGLIFLOZIN 10 MG/1
10 TABLET, FILM COATED ORAL DAILY
Qty: 90 TABLET | Refills: 1 | Status: SHIPPED | OUTPATIENT
Start: 2023-10-02

## 2023-10-02 NOTE — TELEPHONE ENCOUNTER
From: Emily Valdez  To: Awilda Conn  Sent: 9/30/2023 3:18 PM EDT  Subject: need refills on 3 meds please    actos 45mg qd...farxiga 10mg qd.......toujeo insulin pens  please send in refill for me thanks

## 2023-10-16 DIAGNOSIS — E78.2 MIXED HYPERLIPIDEMIA: Chronic | ICD-10-CM

## 2023-10-16 RX ORDER — EZETIMIBE 10 MG/1
10 TABLET ORAL DAILY
Qty: 90 TABLET | Refills: 1 | Status: SHIPPED | OUTPATIENT
Start: 2023-10-16 | End: 2024-10-15

## 2023-10-16 NOTE — TELEPHONE ENCOUNTER
----- Message from Emily Valdez sent at 10/14/2023 12:55 PM EDT -----  Regarding: need refills on 3 meds please  Contact: [phone]  needs also refilled  ezetimibe 10mg qd

## 2023-10-16 NOTE — TELEPHONE ENCOUNTER
Rx Refill Note  Requested Prescriptions      No prescriptions requested or ordered in this encounter        Last office visit with prescribing clinician: 7/10/2023      Next office visit with prescribing clinician: 1/10/2024       Emily Rivas (Jodi)  10/16/23, 08:51 EDT

## 2024-01-10 ENCOUNTER — OFFICE VISIT (OUTPATIENT)
Dept: ENDOCRINOLOGY | Facility: CLINIC | Age: 64
End: 2024-01-10
Payer: COMMERCIAL

## 2024-01-10 VITALS
OXYGEN SATURATION: 98 % | HEART RATE: 86 BPM | SYSTOLIC BLOOD PRESSURE: 128 MMHG | WEIGHT: 293 LBS | HEIGHT: 62 IN | DIASTOLIC BLOOD PRESSURE: 70 MMHG | BODY MASS INDEX: 53.92 KG/M2

## 2024-01-10 DIAGNOSIS — E03.9 ACQUIRED HYPOTHYROIDISM: Chronic | ICD-10-CM

## 2024-01-10 DIAGNOSIS — E11.40 CONTROLLED TYPE 2 DIABETES WITH NEUROPATHY: Primary | Chronic | ICD-10-CM

## 2024-01-10 DIAGNOSIS — E78.2 MIXED HYPERLIPIDEMIA: Chronic | ICD-10-CM

## 2024-01-10 LAB
EXPIRATION DATE: ABNORMAL
EXPIRATION DATE: NORMAL
GLUCOSE BLDC GLUCOMTR-MCNC: 112 MG/DL (ref 70–130)
HBA1C MFR BLD: 6.2 % (ref 4.5–5.7)
Lab: ABNORMAL
Lab: NORMAL

## 2024-01-10 PROCEDURE — 84443 ASSAY THYROID STIM HORMONE: CPT | Performed by: PHYSICIAN ASSISTANT

## 2024-01-10 PROCEDURE — 80053 COMPREHEN METABOLIC PANEL: CPT | Performed by: PHYSICIAN ASSISTANT

## 2024-01-10 PROCEDURE — 80061 LIPID PANEL: CPT | Performed by: PHYSICIAN ASSISTANT

## 2024-01-10 PROCEDURE — 84439 ASSAY OF FREE THYROXINE: CPT | Performed by: PHYSICIAN ASSISTANT

## 2024-01-10 RX ORDER — INSULIN GLARGINE 300 U/ML
70 INJECTION, SOLUTION SUBCUTANEOUS NIGHTLY
Qty: 21 ML | Refills: 1 | Status: SHIPPED | OUTPATIENT
Start: 2024-01-10

## 2024-01-10 RX ORDER — INSULIN LISPRO 100 [IU]/ML
INJECTION, SOLUTION INTRAVENOUS; SUBCUTANEOUS
Qty: 72 ML | Refills: 1 | Status: SHIPPED | OUTPATIENT
Start: 2024-01-10

## 2024-01-10 RX ORDER — INSULIN LISPRO 100 [IU]/ML
INJECTION, SOLUTION INTRAVENOUS; SUBCUTANEOUS
Qty: 68 ML | Refills: 1 | Status: SHIPPED | OUTPATIENT
Start: 2024-01-10 | End: 2024-01-10 | Stop reason: SDUPTHER

## 2024-01-10 RX ORDER — LEVOTHYROXINE SODIUM 112 UG/1
112 TABLET ORAL DAILY
Qty: 90 TABLET | Refills: 1 | Status: SHIPPED | OUTPATIENT
Start: 2024-01-10 | End: 2025-01-09

## 2024-01-10 RX ORDER — INSULIN GLARGINE 300 U/ML
60 INJECTION, SOLUTION SUBCUTANEOUS NIGHTLY
Qty: 18 ML | Refills: 1 | Status: SHIPPED | OUTPATIENT
Start: 2024-01-10 | End: 2024-01-10 | Stop reason: SDUPTHER

## 2024-01-10 RX ORDER — EZETIMIBE 10 MG/1
10 TABLET ORAL DAILY
Qty: 90 TABLET | Refills: 1 | Status: SHIPPED | OUTPATIENT
Start: 2024-01-10 | End: 2025-01-09

## 2024-01-10 RX ORDER — PIOGLITAZONEHYDROCHLORIDE 45 MG/1
45 TABLET ORAL DAILY
Qty: 90 TABLET | Refills: 1 | Status: SHIPPED | OUTPATIENT
Start: 2024-01-10 | End: 2025-01-09

## 2024-01-10 NOTE — PROGRESS NOTES
Chief Complaint  F/u for Diabetes Mellitus and Hypothyroidism.    HPI   Emily Valdez is a 63 y.o. female with CKD 3 who is here today for f/u of Diabetes Mellitus type 2 and hypothyroidism. The initial diagnosis of diabetes was made at age 34.    Diabetic complications: cardiovascular disease s/p multiple MIs followed by Dr Gagnon. She has tingling in both feet x >20 years.   Eye exam current (within one year): utd, regular eye exams. 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 Dr. Francisco hurtado q 4 months, no retinopathy    Current diabetic medications include:  Actos 45mg qd - tolerating well  Farxiga 10 mg daily  Toujeo 70U QHS   Humalog 20-25 units with meals  Statin: lipitor 80    Past medications: Byetta (n/v), metformin (dc d/t decreased renal fxn), trajenta, glyburide    Diabetic Monitoring  -   Checking 3x/day  Fasting  (mostly ~100-120)  Before lunch   Before dinner   No hypoglycemia    Did not like Freestyle Janusz- was not accurate for her     Hypothyroidism- dx in her late 20s, on levothyroxine 112 mcg daily    The following portions of the patient's history were reviewed and updated by me as appropriate: allergies, current medications, past family history, past social history, past surgical history and problem list.      Past Medical History:   Diagnosis Date    Hypertension in my 30's    Hypothyroidism in my 30's    Mixed hyperlipidemia 02/22/2021    Type 2 diabetes mellitus in my 30's       Medications    Current Outpatient Medications:     albuterol sulfate  (90 Base) MCG/ACT inhaler, Inhale 1 puff As Needed., Disp: , Rfl:     amitriptyline (ELAVIL) 10 MG tablet, Take 1 tablet by mouth Every Night., Disp: , Rfl:     amLODIPine (NORVASC) 10 MG tablet, Take 1 tablet by mouth Daily., Disp: , Rfl:     aspirin 81 MG EC tablet, Take 1 tablet by mouth Daily., Disp: , Rfl:     atorvastatin (LIPITOR) 80 MG tablet, Take 1 tablet by mouth Every Night., Disp: , Rfl:     bisoprolol (ZEBeta) 5 MG  "tablet, Take 1 tablet by mouth 2 (Two) Times a Day., Disp: , Rfl:     BRILINTA 90 MG tablet tablet, Take 1 tablet by mouth 2 (Two) Times a Day., Disp: , Rfl:     dapagliflozin Propanediol (Farxiga) 10 MG tablet, Take 10 mg by mouth Daily., Disp: 90 tablet, Rfl: 1    ezetimibe (Zetia) 10 MG tablet, Take 1 tablet by mouth Daily., Disp: 90 tablet, Rfl: 1    furosemide (LASIX) 40 MG tablet, Take 1 tablet by mouth 2 (Two) Times a Day., Disp: , Rfl:     Insulin Glargine, 1 Unit Dial, (Toujeo SoloStar) 300 UNIT/ML solution pen-injector injection, Inject 70 Units under the skin into the appropriate area as directed Every Night. Void previous rx, Disp: 21 mL, Rfl: 1    Insulin Lispro, 1 Unit Dial, (HumaLOG KwikPen) 100 UNIT/ML solution pen-injector, Take 20-25 units with meals TID, MDD 75 units, void previous rx, Disp: 72 mL, Rfl: 1    Insulin Pen Needle 32G X 4 MM misc, Use QID, Disp: 400 each, Rfl: 3    Lancets 30G misc, Use as directed to test blood sugar 3-4 times a day, Disp: 100 each, Rfl: 5    levothyroxine (Synthroid) 112 MCG tablet, Take 1 tablet by mouth Daily., Disp: 90 tablet, Rfl: 1    nitroglycerin (NITROSTAT) 0.4 MG SL tablet, Place 1 tablet under the tongue As Needed., Disp: , Rfl:     pantoprazole (PROTONIX) 40 MG EC tablet, Take 1 tablet by mouth Daily., Disp: , Rfl:     pioglitazone (Actos) 45 MG tablet, Take 1 tablet by mouth Daily., Disp: 90 tablet, Rfl: 1    potassium chloride (MICRO-K) 10 MEQ CR capsule, Take 1 capsule by mouth Daily., Disp: , Rfl:     valsartan (DIOVAN) 320 MG tablet, Take 1 tablet by mouth Daily., Disp: , Rfl:     Review of Systems  Review of Systems   All other systems reviewed and are negative.       Physical Exam    /70   Pulse 86   Ht 157.5 cm (62\")   Wt (!) 140 kg (308 lb)   SpO2 98%   BMI 56.33 kg/m² Body mass index is 56.33 kg/m².  Physical Exam   Constitutional: She is oriented to person, place, and time. She appears well-developed. No distress.   Eyes: Lids are " normal. Right pupil is reactive. Left pupil is reactive.   Neck: No JVD present. No tracheal deviation present. No thyroid mass and no thyromegaly present.   Cardiovascular: Normal rate, regular rhythm and normal heart sounds.   No murmur heard.  Pulmonary/Chest: Effort normal and breath sounds normal. No respiratory distress. She has no wheezes.   Musculoskeletal: No tenderness.    Emily had a diabetic foot exam performed today.   During the foot exam she had a monofilament test performed.    Neurological Sensory Findings -  Altered sharp/dull right ankle/foot discrimination and altered sharp/dull left ankle/foot discrimination. Right ankle/foot altered proprioception and left ankle/foot altered proprioception.  Vascular Status -  Her right foot exhibits normal foot vasculature  and no edema. Her left foot exhibits normal foot vasculature  and no edema.  Skin Integrity  -  Her right foot skin is intact.  She has no right foot onychomycosis, no right foot ulcer and non-callous right foot.Her left foot skin is intact. She has no left foot onychomycosis, no left foot ulcer and non-callous left foot..  Lymphadenopathy:     She has no cervical adenopathy.   Neurological: She is alert and oriented to person, place, and time.   Skin: Skin is warm and dry. No rash noted. She is not diaphoretic. No erythema.   Psychiatric: Her speech is normal and behavior is normal. Thought content normal.       Labs and Imaging   Lab Results   Component Value Date    HGBA1C 6.2 (A) 01/10/2024    HGBA1C 6.0 07/10/2023    HGBA1C 5.7 01/09/2023     Office Visit on 01/10/2024   Component Date Value Ref Range Status    Hemoglobin A1C 01/10/2024 6.2 (A)  4.5 - 5.7 % Final    Lot Number 01/10/2024 [id number]   Final    Expiration Date 01/10/2024 07-   Final    Glucose 01/10/2024 112  70 - 130 mg/dL Final    Lot Number 01/10/2024 [id number]   Final    Expiration Date 01/10/2024 06-   Final     CMP          7/10/2023    13:41   CMP    Glucose 86    BUN 20    Creatinine 1.77    EGFR 32.4    Sodium 144    Potassium 4.5    Chloride 106    Calcium 10.5    Total Protein 8.1    Albumin 4.1    Globulin 4.0    Total Bilirubin 0.4    Alkaline Phosphatase 141    AST (SGOT) 16    ALT (SGPT) 8    Albumin/Globulin Ratio 1.0    BUN/Creatinine Ratio 11.3    Anion Gap 12.8        Lipid Panel          7/10/2023    13:41   Lipid Panel   Total Cholesterol 99    Triglycerides 59    HDL Cholesterol 35    VLDL Cholesterol 13    LDL Cholesterol  51    LDL/HDL Ratio 1.49      TSH          7/10/2023    13:41   TSH   TSH 1.880      Most Recent A1C          1/10/2024    13:25   HGBA1C Most Recent   Hemoglobin A1C 6.2      Microalbumin          7/10/2023    13:41   Microalbumin   Microalbumin, Urine <1.2        Assessment / Plan   Diagnoses and all orders for this visit:    1. Controlled type 2 diabetes with neuropathy (Primary)  -     POC Glycosylated Hemoglobin (Hb A1C)  -     POC Glucose, Blood  -     dapagliflozin Propanediol (Farxiga) 10 MG tablet; Take 10 mg by mouth Daily.  Dispense: 90 tablet; Refill: 1  -     Discontinue: Insulin Glargine, 1 Unit Dial, (Toujeo SoloStar) 300 UNIT/ML solution pen-injector injection; Inject 60 Units under the skin into the appropriate area as directed Every Night.  Dispense: 18 mL; Refill: 1  -     Discontinue: Insulin Lispro, 1 Unit Dial, (HumaLOG KwikPen) 100 UNIT/ML solution pen-injector; Take 10-25 units with meals TID, MDD 75 units  Dispense: 68 mL; Refill: 1  -     pioglitazone (Actos) 45 MG tablet; Take 1 tablet by mouth Daily.  Dispense: 90 tablet; Refill: 1  -     Insulin Glargine, 1 Unit Dial, (Toujeo SoloStar) 300 UNIT/ML solution pen-injector injection; Inject 70 Units under the skin into the appropriate area as directed Every Night. Void previous rx  Dispense: 21 mL; Refill: 1  -     Insulin Lispro, 1 Unit Dial, (HumaLOG KwikPen) 100 UNIT/ML solution pen-injector; Take 20-25 units with meals TID, MDD 75 units, void  previous rx  Dispense: 72 mL; Refill: 1  -     Comprehensive Metabolic Panel  -     Lipid Panel    2. Mixed hyperlipidemia  -     ezetimibe (Zetia) 10 MG tablet; Take 1 tablet by mouth Daily.  Dispense: 90 tablet; Refill: 1  -     Comprehensive Metabolic Panel  -     Lipid Panel    3. Acquired hypothyroidism  -     levothyroxine (Synthroid) 112 MCG tablet; Take 1 tablet by mouth Daily.  Dispense: 90 tablet; Refill: 1  -     TSH  -     T4, Free        Diabetes Mellitus 2 is now under good control  -with peripheral neuropathy, CAD, CKD3  -A1c 6.2  -no hypoglycemia  -humalog- continue 20-25 units with meals  -continue toujeo 70 U qhs.   -continue actos 45 mg qd and farxiga 10 mg qd  -Did not tolerate GLP-1 agonist in the past  -foot exam updated today, labs today, UACR is utd, eye exam q 4 months with dr miguelito hurtado    Hypothyroidism  -repeat TFTs today  -cont levothyroxine 137mcg daily.    Hyperlipidemia  -repeat lipids today   -continue lipitor 80 and zetia 10       There are no Patient Instructions on file for this visit.    Follow up: Return in about 6 months (around 7/10/2024).     Awilda Conn PA-C

## 2024-01-11 DIAGNOSIS — E03.9 ACQUIRED HYPOTHYROIDISM: Primary | ICD-10-CM

## 2024-01-11 LAB
ALBUMIN SERPL-MCNC: 4.1 G/DL (ref 3.5–5.2)
ALBUMIN/GLOB SERPL: 1.1 G/DL
ALP SERPL-CCNC: 127 U/L (ref 39–117)
ALT SERPL W P-5'-P-CCNC: 18 U/L (ref 1–33)
ANION GAP SERPL CALCULATED.3IONS-SCNC: 15.6 MMOL/L (ref 5–15)
AST SERPL-CCNC: 16 U/L (ref 1–32)
BILIRUB SERPL-MCNC: 0.6 MG/DL (ref 0–1.2)
BUN SERPL-MCNC: 19 MG/DL (ref 8–23)
BUN/CREAT SERPL: 11.7 (ref 7–25)
CALCIUM SPEC-SCNC: 9.8 MG/DL (ref 8.6–10.5)
CHLORIDE SERPL-SCNC: 107 MMOL/L (ref 98–107)
CHOLEST SERPL-MCNC: 107 MG/DL (ref 0–200)
CO2 SERPL-SCNC: 23.4 MMOL/L (ref 22–29)
CREAT SERPL-MCNC: 1.62 MG/DL (ref 0.57–1)
EGFRCR SERPLBLD CKD-EPI 2021: 36 ML/MIN/1.73
GLOBULIN UR ELPH-MCNC: 3.9 GM/DL
GLUCOSE SERPL-MCNC: 106 MG/DL (ref 65–99)
HDLC SERPL-MCNC: 38 MG/DL (ref 40–60)
LDLC SERPL CALC-MCNC: 54 MG/DL (ref 0–100)
LDLC/HDLC SERPL: 1.46 {RATIO}
POTASSIUM SERPL-SCNC: 3.6 MMOL/L (ref 3.5–5.2)
PROT SERPL-MCNC: 8 G/DL (ref 6–8.5)
SODIUM SERPL-SCNC: 146 MMOL/L (ref 136–145)
T4 FREE SERPL-MCNC: 1.59 NG/DL (ref 0.93–1.7)
TRIGL SERPL-MCNC: 68 MG/DL (ref 0–150)
TSH SERPL DL<=0.05 MIU/L-ACNC: 4.42 UIU/ML (ref 0.27–4.2)
VLDLC SERPL-MCNC: 15 MG/DL (ref 5–40)

## 2024-06-26 DIAGNOSIS — E03.9 ACQUIRED HYPOTHYROIDISM: Chronic | ICD-10-CM

## 2024-06-26 DIAGNOSIS — E11.40 CONTROLLED TYPE 2 DIABETES WITH NEUROPATHY: Chronic | ICD-10-CM

## 2024-06-26 RX ORDER — LEVOTHYROXINE SODIUM 112 UG/1
112 TABLET ORAL DAILY
Qty: 90 TABLET | Refills: 0 | Status: SHIPPED | OUTPATIENT
Start: 2024-06-26

## 2024-06-26 RX ORDER — INSULIN LISPRO 100 [IU]/ML
INJECTION, SOLUTION INTRAVENOUS; SUBCUTANEOUS
Qty: 67.5 ML | Refills: 0 | Status: SHIPPED | OUTPATIENT
Start: 2024-06-26

## 2024-06-26 NOTE — TELEPHONE ENCOUNTER
Rx Refill Note  Requested Prescriptions     Pending Prescriptions Disp Refills    levothyroxine (SYNTHROID, LEVOTHROID) 112 MCG tablet [Pharmacy Med Name: LEVOTHYROXINE 112 MCG TABLET] 90 tablet 0     Sig: TAKE 1 TABLET BY MOUTH ONCE A DAY    Insulin Lispro, 1 Unit Dial, (HumaLOG KwikPen) 100 UNIT/ML solution pen-injector [Pharmacy Med Name: HUMALOG 100 UNIT/ML KWIKPEN] 60 mL 0     Sig: INJECT 20-25 UNITS SUBCUTANEOUSLY THREE TIMES A DAY WITH MEALS. MDD OF 75 UNITS      Last office visit with prescribing clinician: 1/10/2024     Next office visit with prescribing clinician: 6/26/2024                           Noemy Peng MA  06/26/24, 14:03 EDT

## 2024-06-29 DIAGNOSIS — E11.40 CONTROLLED TYPE 2 DIABETES WITH NEUROPATHY: Chronic | ICD-10-CM

## 2024-07-01 RX ORDER — INSULIN LISPRO 100 [IU]/ML
INJECTION, SOLUTION INTRAVENOUS; SUBCUTANEOUS
Qty: 30 ML | OUTPATIENT
Start: 2024-07-01

## 2024-07-16 ENCOUNTER — OFFICE VISIT (OUTPATIENT)
Dept: ENDOCRINOLOGY | Facility: CLINIC | Age: 64
End: 2024-07-16
Payer: COMMERCIAL

## 2024-07-16 VITALS
DIASTOLIC BLOOD PRESSURE: 70 MMHG | SYSTOLIC BLOOD PRESSURE: 122 MMHG | BODY MASS INDEX: 53.92 KG/M2 | HEART RATE: 64 BPM | WEIGHT: 293 LBS | OXYGEN SATURATION: 98 % | HEIGHT: 62 IN

## 2024-07-16 DIAGNOSIS — E03.9 ACQUIRED HYPOTHYROIDISM: Chronic | ICD-10-CM

## 2024-07-16 DIAGNOSIS — E78.2 MIXED HYPERLIPIDEMIA: Chronic | ICD-10-CM

## 2024-07-16 DIAGNOSIS — E11.40 CONTROLLED TYPE 2 DIABETES WITH NEUROPATHY: Primary | Chronic | ICD-10-CM

## 2024-07-16 LAB
EXPIRATION DATE: ABNORMAL
EXPIRATION DATE: NORMAL
GLUCOSE BLDC GLUCOMTR-MCNC: 91 MG/DL (ref 70–130)
HBA1C MFR BLD: 6.1 % (ref 4.5–5.7)
Lab: ABNORMAL
Lab: NORMAL

## 2024-07-16 PROCEDURE — 82570 ASSAY OF URINE CREATININE: CPT | Performed by: PHYSICIAN ASSISTANT

## 2024-07-16 PROCEDURE — 80053 COMPREHEN METABOLIC PANEL: CPT | Performed by: PHYSICIAN ASSISTANT

## 2024-07-16 PROCEDURE — 82947 ASSAY GLUCOSE BLOOD QUANT: CPT | Performed by: PHYSICIAN ASSISTANT

## 2024-07-16 PROCEDURE — 84443 ASSAY THYROID STIM HORMONE: CPT | Performed by: PHYSICIAN ASSISTANT

## 2024-07-16 PROCEDURE — 83036 HEMOGLOBIN GLYCOSYLATED A1C: CPT | Performed by: PHYSICIAN ASSISTANT

## 2024-07-16 PROCEDURE — 82043 UR ALBUMIN QUANTITATIVE: CPT | Performed by: PHYSICIAN ASSISTANT

## 2024-07-16 PROCEDURE — 80061 LIPID PANEL: CPT | Performed by: PHYSICIAN ASSISTANT

## 2024-07-16 PROCEDURE — 99214 OFFICE O/P EST MOD 30 MIN: CPT | Performed by: PHYSICIAN ASSISTANT

## 2024-07-16 PROCEDURE — 84439 ASSAY OF FREE THYROXINE: CPT | Performed by: PHYSICIAN ASSISTANT

## 2024-07-16 RX ORDER — DAPAGLIFLOZIN 10 MG/1
10 TABLET, FILM COATED ORAL DAILY
Qty: 90 TABLET | Refills: 1 | Status: SHIPPED | OUTPATIENT
Start: 2024-07-16

## 2024-07-16 NOTE — PROGRESS NOTES
Chief Complaint  F/u for Diabetes Mellitus and Hypothyroidism.    HPI   Emily Valdez is a 64 y.o. female with CKD 3 who is here today for f/u of Diabetes Mellitus type 2 and hypothyroidism. The initial diagnosis of diabetes was made at age 34.    Overall doing ok.   Her mom passed away since last visit.     Diabetic complications: cardiovascular disease s/p multiple MIs followed by Dr Gagnon. She has tingling in both feet x >20 years.   Eye exam current (within one year): utd, regular eye exams. 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 Dr. Francisco hurtado q 4 months, no retinopathy    Current diabetic medications include:  Actos 45mg qd - tolerating well  Farxiga 10 mg daily  Toujeo 70U QHS   Humalog 20-25 units with meals  Statin: lipitor 80    Past medications: Byetta (n/v), metformin (dc d/t decreased renal fxn), trajenta, glyburide    Diabetic Monitoring  -   Checking 3x/day  Fasting   Before lunch 104-126  Before dinner 111-131  Hypoglycemia if missed meals    Did not like Freestyle Janusz- was not accurate for her     Hypothyroidism- dx in her late 20s, on levothyroxine 112 mcg daily    The following portions of the patient's history were reviewed and updated by me as appropriate: allergies, current medications, past family history, past social history, past surgical history and problem list.    Past Medical History:   Diagnosis Date    Hypertension in my 30's    Hypothyroidism in my 30's    Mixed hyperlipidemia 02/22/2021    Type 2 diabetes mellitus in my 30's       Medications    Current Outpatient Medications:     albuterol sulfate  (90 Base) MCG/ACT inhaler, Inhale 1 puff As Needed., Disp: , Rfl:     amitriptyline (ELAVIL) 10 MG tablet, Take 1 tablet by mouth Every Night., Disp: , Rfl:     amLODIPine (NORVASC) 10 MG tablet, Take 1 tablet by mouth Daily., Disp: , Rfl:     aspirin 81 MG EC tablet, Take 1 tablet by mouth Daily., Disp: , Rfl:     atorvastatin (LIPITOR) 80 MG tablet, Take 1 tablet by mouth  "Every Night., Disp: , Rfl:     bisoprolol (ZEBeta) 5 MG tablet, Take 1 tablet by mouth 2 (Two) Times a Day., Disp: , Rfl:     BRILINTA 90 MG tablet tablet, Take 1 tablet by mouth 2 (Two) Times a Day., Disp: , Rfl:     dapagliflozin Propanediol (Farxiga) 10 MG tablet, Take 10 mg by mouth Daily., Disp: 90 tablet, Rfl: 1    ezetimibe (Zetia) 10 MG tablet, Take 1 tablet by mouth Daily., Disp: 90 tablet, Rfl: 1    furosemide (LASIX) 40 MG tablet, Take 1 tablet by mouth 2 (Two) Times a Day., Disp: , Rfl:     Insulin Glargine, 1 Unit Dial, (Toujeo SoloStar) 300 UNIT/ML solution pen-injector injection, Inject 70 Units under the skin into the appropriate area as directed Every Night. Void previous rx, Disp: 21 mL, Rfl: 1    Insulin Lispro, 1 Unit Dial, (HumaLOG KwikPen) 100 UNIT/ML solution pen-injector, INJECT 20-25 UNITS SUBCUTANEOUSLY THREE TIMES A DAY WITH MEALS. MDD OF 75 UNITS, Disp: 67.5 mL, Rfl: 0    Insulin Pen Needle 32G X 4 MM misc, Use QID, Disp: 400 each, Rfl: 3    Lancets 30G misc, Use as directed to test blood sugar 3-4 times a day, Disp: 100 each, Rfl: 5    levothyroxine (SYNTHROID, LEVOTHROID) 112 MCG tablet, TAKE 1 TABLET BY MOUTH ONCE A DAY, Disp: 90 tablet, Rfl: 0    nitroglycerin (NITROSTAT) 0.4 MG SL tablet, Place 1 tablet under the tongue As Needed., Disp: , Rfl:     pantoprazole (PROTONIX) 40 MG EC tablet, Take 1 tablet by mouth Daily., Disp: , Rfl:     pioglitazone (Actos) 45 MG tablet, Take 1 tablet by mouth Daily., Disp: 90 tablet, Rfl: 1    potassium chloride (MICRO-K) 10 MEQ CR capsule, Take 1 capsule by mouth Daily., Disp: , Rfl:     valsartan (DIOVAN) 320 MG tablet, Take 1 tablet by mouth Daily., Disp: , Rfl:     Review of Systems  Review of Systems   All other systems reviewed and are negative.       Physical Exam    /70   Pulse 64   Ht 157.5 cm (62\")   Wt (!) 145 kg (319 lb)   SpO2 98%   BMI 58.35 kg/m² Body mass index is 58.35 kg/m².  Physical Exam   Constitutional: She is " oriented to person, place, and time. She appears well-developed. No distress.   HENT:   Head: Normocephalic.   Eyes: Lids are normal. Right pupil is reactive. Left pupil is reactive.   Neck: No JVD present. No tracheal deviation present. No thyroid mass and no thyromegaly present.   Cardiovascular: Normal rate and regular rhythm.   Pulmonary/Chest: Effort normal and breath sounds normal.   Abdominal: Soft.   Musculoskeletal: No tenderness.   Lymphadenopathy:     She has no cervical adenopathy.   Neurological: She is alert and oriented to person, place, and time.   Skin: Skin is warm and dry. No rash noted. She is not diaphoretic. No erythema.   Psychiatric: Her speech is normal and behavior is normal. Thought content normal.       Labs and Imaging   Lab Results   Component Value Date    HGBA1C 6.1 (A) 07/16/2024    HGBA1C 6.2 (A) 01/10/2024    HGBA1C 6.0 07/10/2023     Office Visit on 07/16/2024   Component Date Value Ref Range Status    Hemoglobin A1C 07/16/2024 6.1 (A)  4.5 - 5.7 % Final    Lot Number 07/16/2024 [id number]   Final    Expiration Date 07/16/2024 10/23/2023   Final    Glucose 07/16/2024 91  70 - 130 mg/dL Final    Lot Number 07/16/2024 [id number]   Final    Expiration Date 07/16/2024 10/52,024   Final     CMP          1/10/2024    13:44   CMP   Glucose 106    BUN 19    Creatinine 1.62    EGFR 36.0    Sodium 146    Potassium 3.6    Chloride 107    Calcium 9.8    Total Protein 8.0    Albumin 4.1    Globulin 3.9    Total Bilirubin 0.6    Alkaline Phosphatase 127    AST (SGOT) 16    ALT (SGPT) 18    Albumin/Globulin Ratio 1.1    BUN/Creatinine Ratio 11.7    Anion Gap 15.6        Lipid Panel          1/10/2024    13:44   Lipid Panel   Total Cholesterol 107    Triglycerides 68    HDL Cholesterol 38    VLDL Cholesterol 15    LDL Cholesterol  54    LDL/HDL Ratio 1.46      TSH          1/10/2024    13:44   TSH   TSH 4.420      Most Recent A1C          7/16/2024    13:11   HGBA1C Most Recent   Hemoglobin A1C  6.1            Assessment / Plan   Diagnoses and all orders for this visit:    1. Controlled type 2 diabetes with neuropathy (Primary)  -     POC Glycosylated Hemoglobin (Hb A1C)  -     POC Glucose, Blood  -     Microalbumin / Creatinine Urine Ratio - Urine, Clean Catch  -     Comprehensive Metabolic Panel  -     Lipid Panel  -     dapagliflozin Propanediol (Farxiga) 10 MG tablet; Take 10 mg by mouth Daily.  Dispense: 90 tablet; Refill: 1    2. Acquired hypothyroidism  -     TSH  -     T4, Free    3. Mixed hyperlipidemia  -     Comprehensive Metabolic Panel  -     Lipid Panel        Diabetes Mellitus 2 is now under good control  -with peripheral neuropathy, CAD, CKD3  -A1c 6.1  -no hypoglycemia, unless missing meals  -humalog- continue 20-25 units with meals  -continue toujeo 70 U qhs.   -continue actos 45 mg qd and farxiga 10 mg qd  -Did not tolerate GLP-1 agonist in the past  -foot exam updated 1/2024, labs today, eye exam q 4 months with dr miguelito hurtado    Hypothyroidism  -repeat TFTs today  -cont levothyroxine 137mcg daily.    Hyperlipidemia  -repeat lipids today   -continue lipitor 80 and zetia 10       There are no Patient Instructions on file for this visit.    Follow up: Return in about 6 months (around 1/16/2025).     Awilda Conn PA-C

## 2024-07-17 LAB
ALBUMIN SERPL-MCNC: 3.9 G/DL (ref 3.5–5.2)
ALBUMIN UR-MCNC: <1.2 MG/DL
ALBUMIN/GLOB SERPL: 1 G/DL
ALP SERPL-CCNC: 140 U/L (ref 39–117)
ALT SERPL W P-5'-P-CCNC: 7 U/L (ref 1–33)
ANION GAP SERPL CALCULATED.3IONS-SCNC: 13 MMOL/L (ref 5–15)
AST SERPL-CCNC: 13 U/L (ref 1–32)
BILIRUB SERPL-MCNC: 0.6 MG/DL (ref 0–1.2)
BUN SERPL-MCNC: 19 MG/DL (ref 8–23)
BUN/CREAT SERPL: 13.8 (ref 7–25)
CALCIUM SPEC-SCNC: 9.9 MG/DL (ref 8.6–10.5)
CHLORIDE SERPL-SCNC: 104 MMOL/L (ref 98–107)
CHOLEST SERPL-MCNC: 105 MG/DL (ref 0–200)
CO2 SERPL-SCNC: 27 MMOL/L (ref 22–29)
CREAT SERPL-MCNC: 1.38 MG/DL (ref 0.57–1)
CREAT UR-MCNC: 22.5 MG/DL
EGFRCR SERPLBLD CKD-EPI 2021: 43.4 ML/MIN/1.73
GLOBULIN UR ELPH-MCNC: 3.9 GM/DL
GLUCOSE SERPL-MCNC: 79 MG/DL (ref 65–99)
HDLC SERPL-MCNC: 42 MG/DL (ref 40–60)
LDLC SERPL CALC-MCNC: 48 MG/DL (ref 0–100)
LDLC/HDLC SERPL: 1.17 {RATIO}
MICROALBUMIN/CREAT UR: NORMAL MG/G{CREAT}
POTASSIUM SERPL-SCNC: 3.9 MMOL/L (ref 3.5–5.2)
PROT SERPL-MCNC: 7.8 G/DL (ref 6–8.5)
SODIUM SERPL-SCNC: 144 MMOL/L (ref 136–145)
T4 FREE SERPL-MCNC: 1.7 NG/DL (ref 0.92–1.68)
TRIGL SERPL-MCNC: 70 MG/DL (ref 0–150)
TSH SERPL DL<=0.05 MIU/L-ACNC: 3.61 UIU/ML (ref 0.27–4.2)
VLDLC SERPL-MCNC: 15 MG/DL (ref 5–40)

## 2024-10-09 DIAGNOSIS — E11.40 CONTROLLED TYPE 2 DIABETES WITH NEUROPATHY: Chronic | ICD-10-CM

## 2024-10-09 DIAGNOSIS — E03.9 ACQUIRED HYPOTHYROIDISM: Chronic | ICD-10-CM

## 2024-10-09 DIAGNOSIS — E78.2 MIXED HYPERLIPIDEMIA: Chronic | ICD-10-CM

## 2024-10-09 RX ORDER — EZETIMIBE 10 MG/1
10 TABLET ORAL DAILY
Qty: 90 TABLET | Refills: 1 | Status: SHIPPED | OUTPATIENT
Start: 2024-10-09 | End: 2025-10-09

## 2024-10-09 RX ORDER — LEVOTHYROXINE SODIUM 112 UG/1
112 TABLET ORAL DAILY
Qty: 90 TABLET | Refills: 1 | Status: SHIPPED | OUTPATIENT
Start: 2024-10-09

## 2024-10-09 RX ORDER — DAPAGLIFLOZIN 10 MG/1
10 TABLET, FILM COATED ORAL DAILY
Qty: 90 TABLET | Refills: 1 | Status: SHIPPED | OUTPATIENT
Start: 2024-10-09

## 2024-10-09 RX ORDER — PIOGLITAZONEHYDROCHLORIDE 45 MG/1
45 TABLET ORAL DAILY
Qty: 90 TABLET | Refills: 1 | Status: SHIPPED | OUTPATIENT
Start: 2024-10-09 | End: 2025-10-09

## 2024-10-09 RX ORDER — INSULIN LISPRO 100 [IU]/ML
INJECTION, SOLUTION INTRAVENOUS; SUBCUTANEOUS
Qty: 67.5 ML | Refills: 1 | Status: SHIPPED | OUTPATIENT
Start: 2024-10-09

## 2024-10-09 RX ORDER — INSULIN GLARGINE 300 U/ML
70 INJECTION, SOLUTION SUBCUTANEOUS NIGHTLY
Qty: 21 ML | Refills: 1 | Status: SHIPPED | OUTPATIENT
Start: 2024-10-09

## 2024-10-09 NOTE — TELEPHONE ENCOUNTER
Rx Refill Note    Requested Prescriptions     Pending Prescriptions Disp Refills    ezetimibe (Zetia) 10 MG tablet 90 tablet 1     Sig: Take 1 tablet by mouth Daily.    pioglitazone (Actos) 45 MG tablet 90 tablet 1     Sig: Take 1 tablet by mouth Daily.    Insulin Glargine, 1 Unit Dial, (Toujeo SoloStar) 300 UNIT/ML solution pen-injector injection 21 mL 1     Sig: Inject 70 Units under the skin into the appropriate area as directed Every Night. Void previous rx    levothyroxine (SYNTHROID, LEVOTHROID) 112 MCG tablet 90 tablet 0     Sig: Take 1 tablet by mouth Daily.    Insulin Lispro, 1 Unit Dial, (HumaLOG KwikPen) 100 UNIT/ML solution pen-injector 67.5 mL 0     Sig: INJECT 20-25 UNITS SUBCUTANEOUSLY THREE TIMES A DAY WITH MEALS. MDD OF 75 UNITS    dapagliflozin Propanediol (Farxiga) 10 MG tablet 90 tablet 1     Sig: Take 10 mg by mouth Daily.        Last office visit with prescribing clinician: 7/16/2024         Next office visit with prescribing clinician: 1/17/2025

## 2025-01-17 ENCOUNTER — OFFICE VISIT (OUTPATIENT)
Dept: ENDOCRINOLOGY | Facility: CLINIC | Age: 65
End: 2025-01-17
Payer: COMMERCIAL

## 2025-01-17 VITALS
DIASTOLIC BLOOD PRESSURE: 66 MMHG | WEIGHT: 293 LBS | OXYGEN SATURATION: 96 % | HEART RATE: 69 BPM | SYSTOLIC BLOOD PRESSURE: 120 MMHG | HEIGHT: 62 IN | BODY MASS INDEX: 53.92 KG/M2

## 2025-01-17 DIAGNOSIS — E11.649 CONTROLLED TYPE 2 DIABETES MELLITUS WITH HYPOGLYCEMIA, WITH LONG-TERM CURRENT USE OF INSULIN: Primary | Chronic | ICD-10-CM

## 2025-01-17 DIAGNOSIS — E78.2 MIXED HYPERLIPIDEMIA: Chronic | ICD-10-CM

## 2025-01-17 DIAGNOSIS — E03.9 ACQUIRED HYPOTHYROIDISM: Chronic | ICD-10-CM

## 2025-01-17 DIAGNOSIS — Z79.4 CONTROLLED TYPE 2 DIABETES MELLITUS WITH HYPOGLYCEMIA, WITH LONG-TERM CURRENT USE OF INSULIN: Primary | Chronic | ICD-10-CM

## 2025-01-17 LAB
BASOPHILS # BLD AUTO: 0.04 10*3/MM3 (ref 0–0.2)
BASOPHILS NFR BLD AUTO: 0.6 % (ref 0–1.5)
DEPRECATED RDW RBC AUTO: 53.3 FL (ref 37–54)
EOSINOPHIL # BLD AUTO: 0.14 10*3/MM3 (ref 0–0.4)
EOSINOPHIL NFR BLD AUTO: 2 % (ref 0.3–6.2)
ERYTHROCYTE [DISTWIDTH] IN BLOOD BY AUTOMATED COUNT: 16 % (ref 12.3–15.4)
EXPIRATION DATE: ABNORMAL
EXPIRATION DATE: NORMAL
GLUCOSE BLDC GLUCOMTR-MCNC: 59 MG/DL (ref 70–130)
HBA1C MFR BLD: 5.5 % (ref 4.5–5.7)
HCT VFR BLD AUTO: 43.9 % (ref 34–46.6)
HGB BLD-MCNC: 14.5 G/DL (ref 12–15.9)
IMM GRANULOCYTES # BLD AUTO: 0.03 10*3/MM3 (ref 0–0.05)
IMM GRANULOCYTES NFR BLD AUTO: 0.4 % (ref 0–0.5)
LYMPHOCYTES # BLD AUTO: 1.18 10*3/MM3 (ref 0.7–3.1)
LYMPHOCYTES NFR BLD AUTO: 17.1 % (ref 19.6–45.3)
Lab: ABNORMAL
Lab: NORMAL
MCH RBC QN AUTO: 30.3 PG (ref 26.6–33)
MCHC RBC AUTO-ENTMCNC: 33 G/DL (ref 31.5–35.7)
MCV RBC AUTO: 91.6 FL (ref 79–97)
MONOCYTES # BLD AUTO: 0.51 10*3/MM3 (ref 0.1–0.9)
MONOCYTES NFR BLD AUTO: 7.4 % (ref 5–12)
NEUTROPHILS NFR BLD AUTO: 5.01 10*3/MM3 (ref 1.7–7)
NEUTROPHILS NFR BLD AUTO: 72.5 % (ref 42.7–76)
NRBC BLD AUTO-RTO: 0 /100 WBC (ref 0–0.2)
PLATELET # BLD AUTO: 270 10*3/MM3 (ref 140–450)
PMV BLD AUTO: 8.8 FL (ref 6–12)
RBC # BLD AUTO: 4.79 10*6/MM3 (ref 3.77–5.28)
WBC NRBC COR # BLD AUTO: 6.91 10*3/MM3 (ref 3.4–10.8)

## 2025-01-17 PROCEDURE — 80050 GENERAL HEALTH PANEL: CPT | Performed by: PHYSICIAN ASSISTANT

## 2025-01-17 PROCEDURE — 82947 ASSAY GLUCOSE BLOOD QUANT: CPT | Performed by: PHYSICIAN ASSISTANT

## 2025-01-17 PROCEDURE — 99214 OFFICE O/P EST MOD 30 MIN: CPT | Performed by: PHYSICIAN ASSISTANT

## 2025-01-17 PROCEDURE — 84439 ASSAY OF FREE THYROXINE: CPT | Performed by: PHYSICIAN ASSISTANT

## 2025-01-17 PROCEDURE — 80061 LIPID PANEL: CPT | Performed by: PHYSICIAN ASSISTANT

## 2025-01-17 RX ORDER — PIOGLITAZONE 45 MG/1
45 TABLET ORAL DAILY
Qty: 90 TABLET | Refills: 1 | Status: SHIPPED | OUTPATIENT
Start: 2025-01-17 | End: 2026-01-17

## 2025-01-17 RX ORDER — LEVOTHYROXINE SODIUM 112 UG/1
112 TABLET ORAL DAILY
Qty: 90 TABLET | Refills: 1 | Status: SHIPPED | OUTPATIENT
Start: 2025-01-17

## 2025-01-17 RX ORDER — DAPAGLIFLOZIN 10 MG/1
10 TABLET, FILM COATED ORAL DAILY
Qty: 90 TABLET | Refills: 1 | Status: SHIPPED | OUTPATIENT
Start: 2025-01-17

## 2025-01-17 RX ORDER — INSULIN LISPRO 100 [IU]/ML
INJECTION, SOLUTION INTRAVENOUS; SUBCUTANEOUS
Qty: 67.5 ML | Refills: 1 | Status: SHIPPED | OUTPATIENT
Start: 2025-01-17

## 2025-01-17 RX ORDER — ATORVASTATIN CALCIUM 80 MG/1
80 TABLET, FILM COATED ORAL NIGHTLY
Qty: 90 TABLET | Refills: 1 | Status: SHIPPED | OUTPATIENT
Start: 2025-01-17

## 2025-01-17 RX ORDER — INSULIN GLARGINE 300 U/ML
64 INJECTION, SOLUTION SUBCUTANEOUS NIGHTLY
Qty: 21 ML | Refills: 1 | Status: SHIPPED | OUTPATIENT
Start: 2025-01-17

## 2025-01-17 RX ORDER — EZETIMIBE 10 MG/1
10 TABLET ORAL DAILY
Qty: 90 TABLET | Refills: 1 | Status: SHIPPED | OUTPATIENT
Start: 2025-01-17 | End: 2026-01-17

## 2025-01-17 NOTE — PROGRESS NOTES
Chief Complaint  F/u for Diabetes Mellitus and Hypothyroidism.    HPI   Emily Valdez is a 64 y.o. female with CKD 3 who is here today for f/u of Diabetes Mellitus type 2 and hypothyroidism. The initial diagnosis of diabetes was made at age 34.    Overall doing ok.   BG 59 during visit today. Asymptomatic. Fasting. Has not taken Humalog today.     Diabetic complications: cardiovascular disease s/p multiple MIs followed by Dr Gagnon. She has tingling in both feet x >20 years.   Eye exam current (within one year): utd, regular eye exams. 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 Dr. Francisco hurtado q 4 months, no retinopathy    Current diabetic medications include:  Actos 45 mg qd - tolerating well  Farxiga 10 mg daily  Toujeo 70U QHS   Humalog 20-25 units with meals  Statin: lipitor 80    Past medications: Byetta (n/v), metformin (dc d/t decreased renal fxn), trajenta, glyburide    Diabetic Monitoring  -   Checking 3x/day  Fasting  (mostly 90s to low 100s)  Before lunch 108-128  Before dinner   No hypoglycemia per log  She denies frequent hypoglycemia    Did not like Freestyle Janusz- was not accurate for her     Hypothyroidism- dx in her late 20s, on levothyroxine 112 mcg daily    The following portions of the patient's history were reviewed and updated by me as appropriate: allergies, current medications, past family history, past social history, past surgical history and problem list.    Past Medical History:   Diagnosis Date    Hypertension in my 30's    Hypothyroidism in my 30's    Mixed hyperlipidemia 02/22/2021    Type 2 diabetes mellitus in my 30's       Medications    Current Outpatient Medications:     albuterol sulfate  (90 Base) MCG/ACT inhaler, Inhale 1 puff As Needed., Disp: , Rfl:     amitriptyline (ELAVIL) 10 MG tablet, Take 1 tablet by mouth Every Night., Disp: , Rfl:     amLODIPine (NORVASC) 10 MG tablet, Take 1 tablet by mouth Daily., Disp: , Rfl:     aspirin 81 MG EC tablet, Take 1 tablet by  mouth Daily., Disp: , Rfl:     atorvastatin (LIPITOR) 80 MG tablet, Take 1 tablet by mouth Every Night., Disp: 90 tablet, Rfl: 1    bisoprolol (ZEBeta) 5 MG tablet, Take 1 tablet by mouth 2 (Two) Times a Day., Disp: , Rfl:     BRILINTA 90 MG tablet tablet, Take 1 tablet by mouth 2 (Two) Times a Day., Disp: , Rfl:     dapagliflozin Propanediol (Farxiga) 10 MG tablet, Take 10 mg by mouth Daily., Disp: 90 tablet, Rfl: 1    ezetimibe (Zetia) 10 MG tablet, Take 1 tablet by mouth Daily., Disp: 90 tablet, Rfl: 1    furosemide (LASIX) 40 MG tablet, Take 1 tablet by mouth 2 (Two) Times a Day., Disp: , Rfl:     Insulin Glargine, 1 Unit Dial, (Toujeo SoloStar) 300 UNIT/ML solution pen-injector injection, Inject 64 Units under the skin into the appropriate area as directed Every Night. Void previous rx, Disp: 21 mL, Rfl: 1    Insulin Lispro, 1 Unit Dial, (HumaLOG KwikPen) 100 UNIT/ML solution pen-injector, INJECT 20-25 UNITS SUBCUTANEOUSLY THREE TIMES A DAY WITH MEALS. MDD OF 75 UNITS, Disp: 67.5 mL, Rfl: 1    Insulin Pen Needle 32G X 4 MM misc, Use QID, Disp: 400 each, Rfl: 3    Lancets 30G misc, Use as directed to test blood sugar 3-4 times a day, Disp: 100 each, Rfl: 5    levothyroxine (SYNTHROID, LEVOTHROID) 112 MCG tablet, Take 1 tablet by mouth Daily., Disp: 90 tablet, Rfl: 1    nitroglycerin (NITROSTAT) 0.4 MG SL tablet, Place 1 tablet under the tongue As Needed., Disp: , Rfl:     pantoprazole (PROTONIX) 40 MG EC tablet, Take 1 tablet by mouth Daily., Disp: , Rfl:     pioglitazone (Actos) 45 MG tablet, Take 1 tablet by mouth Daily., Disp: 90 tablet, Rfl: 1    potassium chloride (MICRO-K) 10 MEQ CR capsule, Take 1 capsule by mouth Daily., Disp: , Rfl:     valsartan (DIOVAN) 320 MG tablet, Take 1 tablet by mouth Daily., Disp: , Rfl:     Review of Systems  Review of Systems   All other systems reviewed and are negative.       Physical Exam    /66 (BP Location: Left arm, Patient Position: Sitting, Cuff Size: Adult)    "Pulse 69   Ht 157.5 cm (62\")   Wt (!) 139 kg (307 lb 3.2 oz)   SpO2 96%   BMI 56.19 kg/m² Body mass index is 56.19 kg/m².  Physical Exam   Constitutional: She is oriented to person, place, and time. She appears well-developed. No distress.   HENT:   Head: Normocephalic.   Eyes: Lids are normal. Right pupil is reactive. Left pupil is reactive.   Neck: No JVD present. No tracheal deviation present. No thyroid mass and no thyromegaly present.   Cardiovascular: Normal rate and regular rhythm.   Pulmonary/Chest: Effort normal and breath sounds normal.   Abdominal: Soft.   Musculoskeletal: No tenderness.    Emily had a diabetic foot exam performed today.   During the foot exam she had a monofilament test performed.    Neurological Sensory Findings -  Unaltered sharp/dull right ankle/foot discrimination and unaltered sharp/dull left ankle/foot discrimination. No right ankle/foot altered proprioception and no left ankle/foot altered proprioception  Vascular Status -  Her right foot exhibits normal foot vasculature  and no edema. Her left foot exhibits normal foot vasculature  and no edema.  Skin Integrity  -  Her right foot skin is intact.  She has no right foot onychomycosis, no right foot ulcer and non-callous right foot.Her left foot skin is intact. She has no left foot onychomycosis, no left foot ulcer and non-callous left foot..  Lymphadenopathy:     She has no cervical adenopathy.   Neurological: She is alert and oriented to person, place, and time.   Skin: Skin is warm and dry. No rash noted. She is not diaphoretic. No erythema.   Psychiatric: Her speech is normal and behavior is normal. Thought content normal.       Labs and Imaging   Lab Results   Component Value Date    HGBA1C 5.5 01/17/2025    HGBA1C 6.1 (A) 07/16/2024    HGBA1C 6.2 (A) 01/10/2024     Office Visit on 01/17/2025   Component Date Value Ref Range Status    Hemoglobin A1C 01/17/2025 5.5  4.5 - 5.7 % Final    Lot Number 01/17/2025 [id number]   " Final    Expiration Date 01/17/2025 10/4/26   Final    Glucose 01/17/2025 59 (A)  70 - 130 mg/dL Final    Lot Number 01/17/2025 [id number]   Final    Expiration Date 01/17/2025 8/9/25   Final     CMP          7/16/2024    13:25   CMP   Glucose 79    BUN 19    Creatinine 1.38    EGFR 43.4    Sodium 144    Potassium 3.9    Chloride 104    Calcium 9.9    Total Protein 7.8    Albumin 3.9    Globulin 3.9    Total Bilirubin 0.6    Alkaline Phosphatase 140    AST (SGOT) 13    ALT (SGPT) 7    Albumin/Globulin Ratio 1.0    BUN/Creatinine Ratio 13.8    Anion Gap 13.0        Lipid Panel          7/16/2024    13:25   Lipid Panel   Total Cholesterol 105    Triglycerides 70    HDL Cholesterol 42    VLDL Cholesterol 15    LDL Cholesterol  48    LDL/HDL Ratio 1.17      TSH          7/16/2024    13:25   TSH   TSH 3.610      Most Recent A1C          1/17/2025    11:05   HGBA1C Most Recent   Hemoglobin A1C 5.5      Microalbumin          7/16/2024    13:25   Microalbumin   Microalbumin, Urine <1.2          Assessment / Plan   Diagnoses and all orders for this visit:    1. Controlled type 2 diabetes mellitus with hypoglycemia, with long-term current use of insulin (Primary)  -     POC Glycosylated Hemoglobin (Hb A1C)  -     POC Glucose, Blood  -     pioglitazone (Actos) 45 MG tablet; Take 1 tablet by mouth Daily.  Dispense: 90 tablet; Refill: 1  -     Insulin Lispro, 1 Unit Dial, (HumaLOG KwikPen) 100 UNIT/ML solution pen-injector; INJECT 20-25 UNITS SUBCUTANEOUSLY THREE TIMES A DAY WITH MEALS. MDD OF 75 UNITS  Dispense: 67.5 mL; Refill: 1  -     Insulin Glargine, 1 Unit Dial, (Toujeo SoloStar) 300 UNIT/ML solution pen-injector injection; Inject 64 Units under the skin into the appropriate area as directed Every Night. Void previous rx  Dispense: 21 mL; Refill: 1  -     Comprehensive Metabolic Panel  -     Lipid Panel  -     CBC & Differential  -     dapagliflozin Propanediol (Farxiga) 10 MG tablet; Take 10 mg by mouth Daily.  Dispense:  90 tablet; Refill: 1    2. Acquired hypothyroidism  -     levothyroxine (SYNTHROID, LEVOTHROID) 112 MCG tablet; Take 1 tablet by mouth Daily.  Dispense: 90 tablet; Refill: 1  -     TSH  -     T4, Free    3. Mixed hyperlipidemia  -     Comprehensive Metabolic Panel  -     Lipid Panel  -     ezetimibe (Zetia) 10 MG tablet; Take 1 tablet by mouth Daily.  Dispense: 90 tablet; Refill: 1  -     atorvastatin (LIPITOR) 80 MG tablet; Take 1 tablet by mouth Every Night.  Dispense: 90 tablet; Refill: 1        Diabetes Mellitus 2 is now under good control  -with peripheral neuropathy, CAD, CKD3  -A1c 5.5  -hypoglycemia treated with juice - repeat bg was 69 and patient had pb crackers at that time  -humalog- continue 20-25 units with meals  -decrease toujeo to 64 U qhs - fasting hypoglycemia during visit today  -continue actos 45 mg qd and farxiga 10 mg qd  -Did not tolerate GLP-1 agonist in the past  -foot exam updated today, labs today, eye exam q 4 months with dr miguelito hurtado    Hypothyroidism  -repeat TFTs today and adjust levothyroxine as needed    Hyperlipidemia  -repeat lipids today   -continue lipitor 80 and zetia 10       There are no Patient Instructions on file for this visit.    Follow up: Return in about 6 months (around 7/17/2025).     Awilda Conn PA-C

## 2025-01-18 LAB
ALBUMIN SERPL-MCNC: 3.6 G/DL (ref 3.5–5.2)
ALBUMIN/GLOB SERPL: 0.9 G/DL
ALP SERPL-CCNC: 116 U/L (ref 39–117)
ALT SERPL W P-5'-P-CCNC: 10 U/L (ref 1–33)
ANION GAP SERPL CALCULATED.3IONS-SCNC: 12.9 MMOL/L (ref 5–15)
AST SERPL-CCNC: 17 U/L (ref 1–32)
BILIRUB SERPL-MCNC: 0.4 MG/DL (ref 0–1.2)
BUN SERPL-MCNC: 28 MG/DL (ref 8–23)
BUN/CREAT SERPL: 13.6 (ref 7–25)
CALCIUM SPEC-SCNC: 9.8 MG/DL (ref 8.6–10.5)
CHLORIDE SERPL-SCNC: 106 MMOL/L (ref 98–107)
CHOLEST SERPL-MCNC: 104 MG/DL (ref 0–200)
CO2 SERPL-SCNC: 25.1 MMOL/L (ref 22–29)
CREAT SERPL-MCNC: 2.06 MG/DL (ref 0.57–1)
EGFRCR SERPLBLD CKD-EPI 2021: 26.8 ML/MIN/1.73
GLOBULIN UR ELPH-MCNC: 4.2 GM/DL
GLUCOSE SERPL-MCNC: 58 MG/DL (ref 65–99)
HDLC SERPL-MCNC: 39 MG/DL (ref 40–60)
LDLC SERPL CALC-MCNC: 51 MG/DL (ref 0–100)
LDLC/HDLC SERPL: 1.33 {RATIO}
POTASSIUM SERPL-SCNC: 4.1 MMOL/L (ref 3.5–5.2)
PROT SERPL-MCNC: 7.8 G/DL (ref 6–8.5)
SODIUM SERPL-SCNC: 144 MMOL/L (ref 136–145)
T4 FREE SERPL-MCNC: 1.67 NG/DL (ref 0.92–1.68)
TRIGL SERPL-MCNC: 66 MG/DL (ref 0–150)
TSH SERPL DL<=0.05 MIU/L-ACNC: 3.81 UIU/ML (ref 0.27–4.2)
VLDLC SERPL-MCNC: 14 MG/DL (ref 5–40)

## 2025-08-22 ENCOUNTER — OFFICE VISIT (OUTPATIENT)
Dept: ENDOCRINOLOGY | Facility: CLINIC | Age: 65
End: 2025-08-22
Payer: COMMERCIAL

## 2025-08-22 VITALS
HEIGHT: 62 IN | BODY MASS INDEX: 53.92 KG/M2 | OXYGEN SATURATION: 96 % | DIASTOLIC BLOOD PRESSURE: 69 MMHG | HEART RATE: 79 BPM | WEIGHT: 293 LBS | SYSTOLIC BLOOD PRESSURE: 132 MMHG

## 2025-08-22 DIAGNOSIS — E03.9 ACQUIRED HYPOTHYROIDISM: Chronic | ICD-10-CM

## 2025-08-22 DIAGNOSIS — E11.40 CONTROLLED TYPE 2 DIABETES WITH NEUROPATHY: Primary | Chronic | ICD-10-CM

## 2025-08-22 DIAGNOSIS — E78.2 MIXED HYPERLIPIDEMIA: Chronic | ICD-10-CM

## 2025-08-22 DIAGNOSIS — N18.32 STAGE 3B CHRONIC KIDNEY DISEASE: ICD-10-CM

## 2025-08-22 LAB
EXPIRATION DATE: NORMAL
EXPIRATION DATE: NORMAL
GLUCOSE BLDC GLUCOMTR-MCNC: 114 MG/DL (ref 70–130)
HBA1C MFR BLD: 5.6 % (ref 4.5–5.7)
Lab: NORMAL
Lab: NORMAL

## 2025-08-22 PROCEDURE — 80053 COMPREHEN METABOLIC PANEL: CPT | Performed by: PHYSICIAN ASSISTANT

## 2025-08-22 PROCEDURE — 82570 ASSAY OF URINE CREATININE: CPT | Performed by: PHYSICIAN ASSISTANT

## 2025-08-22 PROCEDURE — 82043 UR ALBUMIN QUANTITATIVE: CPT | Performed by: PHYSICIAN ASSISTANT

## 2025-08-22 RX ORDER — EZETIMIBE 10 MG/1
10 TABLET ORAL DAILY
Qty: 90 TABLET | Refills: 1 | Status: SHIPPED | OUTPATIENT
Start: 2025-08-22 | End: 2026-08-22

## 2025-08-22 RX ORDER — INSULIN GLARGINE 300 U/ML
70 INJECTION, SOLUTION SUBCUTANEOUS NIGHTLY
Qty: 30 ML | Refills: 1 | Status: SHIPPED | OUTPATIENT
Start: 2025-08-22

## 2025-08-22 RX ORDER — ATORVASTATIN CALCIUM 80 MG/1
80 TABLET, FILM COATED ORAL NIGHTLY
Qty: 90 TABLET | Refills: 1 | Status: SHIPPED | OUTPATIENT
Start: 2025-08-22

## 2025-08-22 RX ORDER — LEVOTHYROXINE SODIUM 112 UG/1
112 TABLET ORAL DAILY
Qty: 90 TABLET | Refills: 1 | Status: SHIPPED | OUTPATIENT
Start: 2025-08-22

## 2025-08-22 RX ORDER — PIOGLITAZONE 45 MG/1
45 TABLET ORAL DAILY
Qty: 90 TABLET | Refills: 1 | Status: SHIPPED | OUTPATIENT
Start: 2025-08-22 | End: 2026-08-22

## 2025-08-22 RX ORDER — ALCOHOL ANTISEPTIC PADS
PADS, MEDICATED (EA) TOPICAL
Qty: 100 EACH | Refills: 5 | Status: SHIPPED | OUTPATIENT
Start: 2025-08-22

## 2025-08-22 RX ORDER — INSULIN LISPRO 100 [IU]/ML
INJECTION, SOLUTION INTRAVENOUS; SUBCUTANEOUS
Qty: 67.5 ML | Refills: 1 | Status: SHIPPED | OUTPATIENT
Start: 2025-08-22

## 2025-08-23 LAB
ALBUMIN SERPL-MCNC: 3.8 G/DL (ref 3.5–5.2)
ALBUMIN UR-MCNC: <1.2 MG/DL
ALBUMIN/GLOB SERPL: 0.9 G/DL
ALP SERPL-CCNC: 108 U/L (ref 39–117)
ALT SERPL W P-5'-P-CCNC: 13 U/L (ref 1–33)
ANION GAP SERPL CALCULATED.3IONS-SCNC: 13.5 MMOL/L (ref 5–15)
AST SERPL-CCNC: 18 U/L (ref 1–32)
BILIRUB SERPL-MCNC: 0.3 MG/DL (ref 0–1.2)
BUN SERPL-MCNC: 54 MG/DL (ref 8–23)
BUN/CREAT SERPL: 22 (ref 7–25)
CALCIUM SPEC-SCNC: 10.1 MG/DL (ref 8.6–10.5)
CHLORIDE SERPL-SCNC: 106 MMOL/L (ref 98–107)
CO2 SERPL-SCNC: 24.5 MMOL/L (ref 22–29)
CREAT SERPL-MCNC: 2.45 MG/DL (ref 0.57–1)
CREAT UR-MCNC: 16.1 MG/DL
EGFRCR SERPLBLD CKD-EPI 2021: 21.6 ML/MIN/1.73
GLOBULIN UR ELPH-MCNC: 4.1 GM/DL
GLUCOSE SERPL-MCNC: 93 MG/DL (ref 65–99)
MICROALBUMIN/CREAT UR: NORMAL MG/G{CREAT}
POTASSIUM SERPL-SCNC: 5.5 MMOL/L (ref 3.5–5.2)
PROT SERPL-MCNC: 7.9 G/DL (ref 6–8.5)
SODIUM SERPL-SCNC: 144 MMOL/L (ref 136–145)

## 2025-08-25 ENCOUNTER — RESULTS FOLLOW-UP (OUTPATIENT)
Dept: ENDOCRINOLOGY | Facility: CLINIC | Age: 65
End: 2025-08-25
Payer: COMMERCIAL

## 2025-08-25 DIAGNOSIS — N18.32 STAGE 3B CHRONIC KIDNEY DISEASE: Primary | ICD-10-CM
